# Patient Record
(demographics unavailable — no encounter records)

---

## 2024-10-29 NOTE — HISTORY OF PRESENT ILLNESS
[FreeTextEntry1] :  37 year old female presents for consultation. Sees Dr. Rowell. Notes double mastectomy due to breast cancer. States cannot be on BC anymore. Reports period comes every 2 weeks and are very heavy. Was advised from medical oncologist to be on Lupron injection for a few months and have bilateral oophorectomy. Dr. Rowell doesn't advise bilateral oophorectomy. Concerned about bone loss and other sxs from Lupron. Also concerned due to father dying at age 44 from MI. Had EKG done this year.   Sono reviewed EMB normal

## 2024-10-29 NOTE — PLAN
[FreeTextEntry1] :  37 year old female presents for consultation.  -Discussed Lupron injection -Addressed concerns about Lupron side effects -Continue seeing medical oncologist -pt had many med onc questions, encouraged to call office and speak with them about tamoxifen/Lupron, reviewed gyn affects of both medications  F/u after speaking with medical oncologist. Safia Casey MD

## 2024-10-29 NOTE — END OF VISIT
[FreeTextEntry3] : I, Suha Anton, acted as a scribe on behalf of Dr. Safia Casey M.D. on 10/29/2024.   All medical entries made by the scribe were at my, Dr. Safia Casey M.D., direction and personally dictated by me on 10/29/2024. I have reviewed the chart and agree that the record accurately reflects my personal performance of the history, physical exam, assessment and plan. I have also personally directed, reviewed, and agreed with the chart.

## 2024-11-20 NOTE — PHYSICAL EXAM
[Fully active, able to carry on all pre-disease performance without restriction] : Status 0 - Fully active, able to carry on all pre-disease performance without restriction [Normal] : affect appropriate [de-identified] : s/p B/L MRM with LARISA reconstruction - well healed scars, no palp masses. B/L ax neg [de-identified] : Multiple subcentimeter cysts on anterior chest and arms

## 2024-11-20 NOTE — ASSESSMENT
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  BRCA panel test negative.  Oncotype DX 18.  She met with Dr. Morales 9/2023 and tamoxifen was prescribed. She is transferring care from Dr. Morales, first visit with me today.  Chart reviewed.  Patient reports that she never started tamoxifen.  She is extremely concerned about endometrial toxicity and some of the other side effects related to tamoxifen.  She is under the impression that her cancer was low-grade and endocrine therapy is not necessary.  We reviewed pathology report in great detail.  We reviewed Oncotype results.  I explained the patient that chemotherapy  would have a marginal 1.6% benefit is not strongly recommended.  Endocrine therapy is strongly recommended due to strong ER/VA positivity and a relatively large tumor size.  Tamoxifen side effects were reviewed in detail.  She is very anxious about tamoxifen toxicity.  She reports very heavy menstrual periods.  We discussed treatment with ovarian suppression and anastrozole.  Patient is interested in ovarian suppression.  Side effects of Lupron and exemestane were reviewed.  She will start Lupron next week.  I will see her after 3-4 doses of Lupron to start AI.  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  We will monitor closely if ovarian suppression helps with inflammation and cysts.  Periods are regular, LMP 2/2024,  using condoms, stopped OCP after dx. Has 2 kids - 15 yrs and 5 yrs. Using condoms. Doesnt want more kids

## 2024-11-20 NOTE — HISTORY OF PRESENT ILLNESS
[de-identified] : The patient's history of present illness began when she first palpated a right upper breast lump sometime in February 2023.  She reports having a primary care routine visit scheduled for March 2023 and at that time she was referred for a diagnostic mammogram and sonogram.  These were performed on 04/20/2023 with the mammogram finding bilateral heterogeneously dense breasts; no suspicious findings were noted in the left breast; there was global asymmetry involving the right breast; in the area of the palpable concern, there was an isodense mass that was microlobulated and containing calcifications; additionally in close proximity, there was another area of architectural distortion with calcifications; there were microcalcifications at the anterior right retroareolar region that appeared to be a group of amorphous microcalcifications of note.  A bilateral breast ultrasound performed on that same date noted in the right breast in the area of palpable concern at the 1 o'clock axis, 3 centimeters from nipple, there was an irregular hypoechoic mass with angular margins measuring 2 x 1 x 2.1 centimeters, indeterminate; additionally at the 1 o'clock axis 2 centimeters from nipple, there was an additional hypoechoic irregular mass that measured 1.7 x 0.9 x 1.6 centimeters; there were multiple other round hypoechoic avascular masses measuring less than 1 centimeter throughout the right breast; there was a right axillary lymph node with cortical thickness of 0.4 centimeters.  An ultrasound sound guided core biopsy of the breast masses and axillary lymph node was recommended.  The patient went on to have an ultrasound-guided core biopsy of the right breast 1 o'clock axis lesion 3 centimeters from nipple with a finding of invasive moderately differentiated ductal carcinoma measuring 13 millimeters, with evidence of DCIS, estrogen receptor positive (greater than 95 percent), progesterone receptor positive (greater than 95 percent), and HER-2/prieto equivocal (2+) with confirmatory FISH returning non amplified/negative. The right axillary lymph node core biopsy returned with lymph node negative for carcinoma.     The patient went on to have a bilateral breast MRI on 05/19/2023 with a finding of marked background enhancement limiting sensitivity of bilateral breasts; in the right breast, contiguous multiple suspicious masses and regional area of non mass enhancement predominantly involving the upper outer and upper central breast with extension to the lower central and lower breast measuring at least 5.8 x 5.2 x 8.2 centimeters with a biopsy marker in the cranial aspect of the 1 o'clock lesions corresponding to biopsy-proven cancer; additionally there was asymmetric enhancement of the right nipple concerning for disease extension into the nipple-areolar complex; there was non mass enhancement abutting the pectoralis muscle without enhancement of the muscle itself or chest wall to suggest involvement; there are other scattered enhancing nonspecific foci; in the left breast, there were scattered enhancing nonspecific foci but no suspicious enhancement of the left breast; there were abnormal right axillary level 1 and level 2 lymph nodes with round morphology and effaced fatty hilum with the largest level 1 lymph node containing a biopsy marker measuring 1.4 centimeters with additional enlarged left axillary 1 lymph nodes ranging in size from 1.4-1.6 centimeters; there was an enlarged 8 millimeter right internal mammary lymph node; there was also a fat containing left internal mammary lymph node.  The patient was seen in consultation on 6/8/23 regarding further treatment recommendations.    The patient has had an [facility] Diagnostic Cancer Genetic Predisposition Panel Test done with a finding of no pathogenic mutations, but multiple variants of unknown significance.  I had an extensive discussion with Ms. Ambriz regarding her newly diagnosed breast cancer, clinical T3 N0 Mx, clinical stage 2A breast cancer, noting the implications of a clinical and radiologic presentation on subsequent risk of developing local and metastatic recurrence.   In light of the above, I recommended proceeding with primary breast surgery at this time, and the patient noted she has already decided to have bilateral mastectomies.  I have noted that if she is surgically operable, which I believe she is at this time, that doing so may provide us more information regarding definitive pathologic prognostic factors which may then drive decision-making as to her potential risk of metastatic recurrence and recommendations for potential adjuvant therapies.  I have noted that should she have a tumor less than 5 centimeters, and lymph node negative, I would consider having her tumor sent off for Oncotype DX testing, having discussed this molecular profiling tool and its role in decision-making as to whether to add adjuvant chemotherapy to adjuvant antiestrogen therapy.  Should she have any lymph node involvement, I have noted that per the RxPonder study, all patients would be recommended to undergo adjuvant chemotherapy and consequently would not send that study off.  I have noted that should she have any of the above that would be an indication for chemotherapy, that I would deliver adjuvant chemotherapy to be determined based on her pathologic findings to potentially decrease the risk of metastatic recurrence.  This would then be followed by adjuvant antiestrogen therapy with Lupron to render her chemically menopausal followed by an aromatase inhibitor such as anastrozole.  The potential risks, benefits, anticipated side effects were preliminarily discussed.  I have noted that should she have node-negative cancer, and have a low risk score on Oncotype DX testing, primary adjuvant antiestrogen therapy alone would be considered.   I asked the patient in advance if percarmandoce she requires adjuvant chemotherapy, whether she wishes any further childbearing and she has replied no and consequently will not refer her for fertility preservation consultation at this time.   I also noted that should her PET-CT scan return with evidence of metastatic disease, above recommendations would defer greatly.  I also discussed the potential role of adjuvant radiation therapy, even in the postmastectomy setting in some patients, having outlined preliminarily the criteria for doing so.  I have deferred definitive adjuvant radiation therapy recommendations to a radiation oncologist in the future.  She went on to have a R skin sparing MRM an dL breast nipple sparing MRM with LARISA reconstruction. Path: 1- Left breast 2- Right breast 3- Right axillary sentinel lymph node 1 4- Right axillary sentinel lymph node 2 5- Right axillary sentinel lymph node 3 6- Right internal mammary lymph node 7- Left internal mammary lymph node  Final Diagnosis  1. Breast, left, mastectomy - Indtraductal papilloma - Proliferative fibrocystic change  2. Breast, right, mastectomy - Invasive moderate to poorly differentiated ductal carcinoma with focal microcalcifications (size: 35 mm), see synoptic summary - Ductal carcinoma in situ (DCIS), solid, cribriform, papillary and micropapillary types with intermediate to high nuclear grade, central necrosis and microcalcifications - Two lymph nodes, negative for carcinoma - Biopsy site change  Note: DCIS extends over a 80  mm area. Invasive carcinoma involves the inked cauterized posterior margin in 2 blocks (largest contiguous focus of involvement measures 2 mm) and inked cauterized anterior margin (focus measures < 1 mm ).  3. Lymph node, right axillary sentinel lymph node 1, excision - Four lymph nodes, negative for carcinoma  4. Lymph node, right axillary sentinel lymph node 2, excision - Three lymph nodes negative for carcinoma  5. Lymph node, right axillary sentinel lymph node 3, excision - Seven lymph nodes negative for carcinoma  6. Lymph node, right internal mammary, excision - One lymph node negative for carcinoma   7. Lymph node, left internal mammary, excision - Fibroadipose tissue with no significant histopathological change - No lymphoid tissue identified  [de-identified] : This is a very pleasant 37-year-old premenopausal lady diagnosed with stage IA T2 N0 MX ER/ND strongly positive HER2/prieto negative moderate to poorly differentiated invasive ductal carcinoma, status post bilateral mastectomies with reconstruction 2023, completed radiation 2023 (margin issue).  BRCA panel test negative.  Oncotype DX 18.  She met with Dr. Morales 2023 and tamoxifen was prescribed. She is transferring care from Dr. Morales, first visit with me today.  Chart reviewed.  2024 Patient reports that she never started tamoxifen.  She is extremely concerned about endometrial toxicity and some of the other side effects related to tamoxifen.  She is under the impression that her cancer was low-grade and endocrine therapy is not necessary.  We reviewed pathology report in great detail.  We reviewed Oncotype results.  I explained the patient that chemotherapy  would have a marginal 1.6% benefit is not strongly recommended.  Endocrine therapy is strongly recommended due to strong ER/ND positivity and a relatively large tumor size.  Tamoxifen side effects were reviewed in detail.  She is very anxious about tamoxifen toxicity.  She reports very heavy menstrual periods.  We discussed treatment with ovarian suppression and anastrozole.  Patient is interested in ovarian suppression.  Side effects of Lupron and exemestane were reviewed.  She will start Lupron next week.  I will see her after 3-4 doses of Lupron to start AI.  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  We will monitor closely if ovarian suppression helps with inflammation and cysts.  Periods are regular, LMP 2024,  using condoms, stopped OCP after dx. Has 2 kids - 15 yrs and 5 yrs. Using condoms. Doesnt want more kids  2024 Seen [facility]  spoke to people, read reviewe insight from others didnt start  periods are heavy, every 2-3 weeks  GYN ???  not on OCP  ? BSO   and 2 kids  doesnt want to call - whatever i fell not   rec second opinion   odx path d/w her still concerned about  healthy  dont want to take and stuff starts happendng to me  had stage   PET  second opinion  ftaher  of heart attack  [place] or OS ai  [place]

## 2024-11-20 NOTE — HISTORY OF PRESENT ILLNESS
[de-identified] : The patient's history of present illness began when she first palpated a right upper breast lump sometime in February 2023.  She reports having a primary care routine visit scheduled for March 2023 and at that time she was referred for a diagnostic mammogram and sonogram.  These were performed on 04/20/2023 with the mammogram finding bilateral heterogeneously dense breasts; no suspicious findings were noted in the left breast; there was global asymmetry involving the right breast; in the area of the palpable concern, there was an isodense mass that was microlobulated and containing calcifications; additionally in close proximity, there was another area of architectural distortion with calcifications; there were microcalcifications at the anterior right retroareolar region that appeared to be a group of amorphous microcalcifications of note.  A bilateral breast ultrasound performed on that same date noted in the right breast in the area of palpable concern at the 1 o'clock axis, 3 centimeters from nipple, there was an irregular hypoechoic mass with angular margins measuring 2 x 1 x 2.1 centimeters, indeterminate; additionally at the 1 o'clock axis 2 centimeters from nipple, there was an additional hypoechoic irregular mass that measured 1.7 x 0.9 x 1.6 centimeters; there were multiple other round hypoechoic avascular masses measuring less than 1 centimeter throughout the right breast; there was a right axillary lymph node with cortical thickness of 0.4 centimeters.  An ultrasound sound guided core biopsy of the breast masses and axillary lymph node was recommended.  The patient went on to have an ultrasound-guided core biopsy of the right breast 1 o'clock axis lesion 3 centimeters from nipple with a finding of invasive moderately differentiated ductal carcinoma measuring 13 millimeters, with evidence of DCIS, estrogen receptor positive (greater than 95 percent), progesterone receptor positive (greater than 95 percent), and HER-2/prieto equivocal (2+) with confirmatory FISH returning non amplified/negative. The right axillary lymph node core biopsy returned with lymph node negative for carcinoma.     The patient went on to have a bilateral breast MRI on 05/19/2023 with a finding of marked background enhancement limiting sensitivity of bilateral breasts; in the right breast, contiguous multiple suspicious masses and regional area of non mass enhancement predominantly involving the upper outer and upper central breast with extension to the lower central and lower breast measuring at least 5.8 x 5.2 x 8.2 centimeters with a biopsy marker in the cranial aspect of the 1 o'clock lesions corresponding to biopsy-proven cancer; additionally there was asymmetric enhancement of the right nipple concerning for disease extension into the nipple-areolar complex; there was non mass enhancement abutting the pectoralis muscle without enhancement of the muscle itself or chest wall to suggest involvement; there are other scattered enhancing nonspecific foci; in the left breast, there were scattered enhancing nonspecific foci but no suspicious enhancement of the left breast; there were abnormal right axillary level 1 and level 2 lymph nodes with round morphology and effaced fatty hilum with the largest level 1 lymph node containing a biopsy marker measuring 1.4 centimeters with additional enlarged left axillary 1 lymph nodes ranging in size from 1.4-1.6 centimeters; there was an enlarged 8 millimeter right internal mammary lymph node; there was also a fat containing left internal mammary lymph node.  The patient was seen in consultation on 6/8/23 regarding further treatment recommendations.    The patient has had an [employer] Diagnostic Cancer Genetic Predisposition Panel Test done with a finding of no pathogenic mutations, but multiple variants of unknown significance.  I had an extensive discussion with Ms. Ambriz regarding her newly diagnosed breast cancer, clinical T3 N0 Mx, clinical stage 2A breast cancer, noting the implications of a clinical and radiologic presentation on subsequent risk of developing local and metastatic recurrence.   In light of the above, I recommended proceeding with primary breast surgery at this time, and the patient noted she has already decided to have bilateral mastectomies.  I have noted that if she is surgically operable, which I believe she is at this time, that doing so may provide us more information regarding definitive pathologic prognostic factors which may then drive decision-making as to her potential risk of metastatic recurrence and recommendations for potential adjuvant therapies.  I have noted that should she have a tumor less than 5 centimeters, and lymph node negative, I would consider having her tumor sent off for Oncotype DX testing, having discussed this molecular profiling tool and its role in decision-making as to whether to add adjuvant chemotherapy to adjuvant antiestrogen therapy.  Should she have any lymph node involvement, I have noted that per the RxPonder study, all patients would be recommended to undergo adjuvant chemotherapy and consequently would not send that study off.  I have noted that should she have any of the above that would be an indication for chemotherapy, that I would deliver adjuvant chemotherapy to be determined based on her pathologic findings to potentially decrease the risk of metastatic recurrence.  This would then be followed by adjuvant antiestrogen therapy with Lupron to render her chemically menopausal followed by an aromatase inhibitor such as anastrozole.  The potential risks, benefits, anticipated side effects were preliminarily discussed.  I have noted that should she have node-negative cancer, and have a low risk score on Oncotype DX testing, primary adjuvant antiestrogen therapy alone would be considered.   I asked the patient in advance if percarmandoce she requires adjuvant chemotherapy, whether she wishes any further childbearing and she has replied no and consequently will not refer her for fertility preservation consultation at this time.   I also noted that should her PET-CT scan return with evidence of metastatic disease, above recommendations would defer greatly.  I also discussed the potential role of adjuvant radiation therapy, even in the postmastectomy setting in some patients, having outlined preliminarily the criteria for doing so.  I have deferred definitive adjuvant radiation therapy recommendations to a radiation oncologist in the future.  She went on to have a R skin sparing MRM an dL breast nipple sparing MRM with LARISA reconstruction. Path: 1- Left breast 2- Right breast 3- Right axillary sentinel lymph node 1 4- Right axillary sentinel lymph node 2 5- Right axillary sentinel lymph node 3 6- Right internal mammary lymph node 7- Left internal mammary lymph node  Final Diagnosis  1. Breast, left, mastectomy - Indtraductal papilloma - Proliferative fibrocystic change  2. Breast, right, mastectomy - Invasive moderate to poorly differentiated ductal carcinoma with focal microcalcifications (size: 35 mm), see synoptic summary - Ductal carcinoma in situ (DCIS), solid, cribriform, papillary and micropapillary types with intermediate to high nuclear grade, central necrosis and microcalcifications - Two lymph nodes, negative for carcinoma - Biopsy site change  Note: DCIS extends over a 80  mm area. Invasive carcinoma involves the inked cauterized posterior margin in 2 blocks (largest contiguous focus of involvement measures 2 mm) and inked cauterized anterior margin (focus measures < 1 mm ).  3. Lymph node, right axillary sentinel lymph node 1, excision - Four lymph nodes, negative for carcinoma  4. Lymph node, right axillary sentinel lymph node 2, excision - Three lymph nodes negative for carcinoma  5. Lymph node, right axillary sentinel lymph node 3, excision - Seven lymph nodes negative for carcinoma  6. Lymph node, right internal mammary, excision - One lymph node negative for carcinoma   7. Lymph node, left internal mammary, excision - Fibroadipose tissue with no significant histopathological change - No lymphoid tissue identified  [de-identified] : This is a very pleasant 37-year-old premenopausal lady diagnosed with stage IA T2 N0 MX ER/MA strongly positive HER2/prieto negative moderate to poorly differentiated invasive ductal carcinoma, status post bilateral mastectomies with reconstruction 2023, completed radiation 2023 (margin issue).  BRCA panel test negative.  Oncotype DX 18.  She met with Dr. Morales 2023 and tamoxifen was prescribed. She is transferring care from Dr. Morales, first visit with me today.  Chart reviewed.  2024 Patient reports that she never started tamoxifen.  She is extremely concerned about endometrial toxicity and some of the other side effects related to tamoxifen.  She is under the impression that her cancer was low-grade and endocrine therapy is not necessary.  We reviewed pathology report in great detail.  We reviewed Oncotype results.  I explained the patient that chemotherapy  would have a marginal 1.6% benefit is not strongly recommended.  Endocrine therapy is strongly recommended due to strong ER/MA positivity and a relatively large tumor size.  Tamoxifen side effects were reviewed in detail.  She is very anxious about tamoxifen toxicity.  She reports very heavy menstrual periods.  We discussed treatment with ovarian suppression and anastrozole.  Patient is interested in ovarian suppression.  Side effects of Lupron and exemestane were reviewed.  She will start Lupron next week.  I will see her after 3-4 doses of Lupron to start AI.  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  We will monitor closely if ovarian suppression helps with inflammation and cysts.  Periods are regular, LMP 2024,  using condoms, stopped OCP after dx. Has 2 kids - 15 yrs and 5 yrs. Using condoms. Doesnt want more kids  2024 Seen [facility]  spoke to people, read reviewe insight from others didnt start  periods are heavy, every 2-3 weeks  GYN ???  not on OCP  ? BSO   and 2 kids  doesnt want to call - whatever i fell not   rec second opinion   odx path d/w her still concerned about  healthy  dont want to take and stuff starts happendng to me  had stage   PET  second opinion  ftaher  of heart attack  cortez or OS ai  ribo

## 2024-11-20 NOTE — ASSESSMENT
[FreeTextEntry1] : This is a very pleasant 37-year-old premenopausal lady diagnosed with stage IA T2 N0 MX ER/WV strongly positive HER2/prieto negative moderate to poorly differentiated invasive ductal carcinoma, status post bilateral mastectomies with reconstruction 7/2023, completed radiation 11/2023 (margin issue).  BRCA panel test negative.  Oncotype DX 18.  She met with Dr. Morales 9/2023 and tamoxifen was prescribed. She is transferring care from Dr. Morales, first visit with me today.  Chart reviewed.  Patient reports that she never started tamoxifen.  She is extremely concerned about endometrial toxicity and some of the other side effects related to tamoxifen.  She is under the impression that her cancer was low-grade and endocrine therapy is not necessary.  We reviewed pathology report in great detail.  We reviewed Oncotype results.  I explained the patient that chemotherapy  would have a marginal 1.6% benefit is not strongly recommended.  Endocrine therapy is strongly recommended due to strong ER/WV positivity and a relatively large tumor size.  Tamoxifen side effects were reviewed in detail.  She is very anxious about tamoxifen toxicity.  She reports very heavy menstrual periods.  We discussed treatment with ovarian suppression and anastrozole.  Patient is interested in ovarian suppression.  Side effects of Lupron and exemestane were reviewed.  She will start Lupron next week.  I will see her after 3-4 doses of Lupron to start AI.  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  We will monitor closely if ovarian suppression helps with inflammation and cysts.  Periods are regular, LMP 2/2024,  using condoms, stopped OCP after dx. Has 2 kids - 15 yrs and 5 yrs. Using condoms. Doesnt want more kids

## 2024-11-20 NOTE — PHYSICAL EXAM
[Fully active, able to carry on all pre-disease performance without restriction] : Status 0 - Fully active, able to carry on all pre-disease performance without restriction [Normal] : affect appropriate [de-identified] : s/p B/L MRM with LARISA reconstruction - well healed scars, no palp masses. B/L ax neg [de-identified] : Multiple subcentimeter cysts on anterior chest and arms

## 2024-12-18 NOTE — HISTORY OF PRESENT ILLNESS
[FreeTextEntry1] : Pt s/p right breast nipple reconstruction and left breast fat grafting.  Pt doing well no complaints.

## 2024-12-18 NOTE — REVIEW OF SYSTEMS
[Fever] : no fever [Chills] : no chills [Negative] : Respiratory [de-identified] : Bra and dressings in place

## 2024-12-18 NOTE — REVIEW OF SYSTEMS
[Fever] : no fever [Chills] : no chills [Negative] : Respiratory [de-identified] : Bra and dressings in place

## 2024-12-18 NOTE — PHYSICAL EXAM
[NI] : Normal [de-identified] : Right breast nipple healing well no sign of infection no erythema.  Left breast healing well fat grafting taken no sign of infection  [de-identified] : Abdomen puncture sites healing well no sign of infection

## 2024-12-18 NOTE — PHYSICAL EXAM
[NI] : Normal [de-identified] : Right breast nipple healing well no sign of infection no erythema.  Left breast healing well fat grafting taken no sign of infection  [de-identified] : Abdomen puncture sites healing well no sign of infection

## 2024-12-18 NOTE — REASON FOR VISIT
[Post Op: _________] : a [unfilled] post op visit [FreeTextEntry1] : Patient is a 37-year-old female s/p: -revision bilateral breasts-Rt nipple recon-FG Lt breast, dos:12.6.24. Patient reports she is doing well, she denies having bleeding, drainage, fever or chills.

## 2025-01-02 NOTE — PHYSICAL EXAM
[NI] : Normal [de-identified] : Right breast nipple healing well no sign of infection no erythema.  Left breast healing well fat grafting taken no sign of infection  [de-identified] : Abdomen puncture sites healing well no sign of infection

## 2025-01-02 NOTE — REVIEW OF SYSTEMS
[Fever] : no fever [Chills] : no chills [Negative] : Respiratory [de-identified] : Bra and dressings in place

## 2025-01-02 NOTE — PHYSICAL EXAM
[NI] : Normal [de-identified] : Right breast nipple healing well no sign of infection no erythema.  Left breast healing well fat grafting taken no sign of infection  [de-identified] : Abdomen puncture sites healing well no sign of infection

## 2025-01-02 NOTE — REVIEW OF SYSTEMS
[Fever] : no fever [Chills] : no chills [Negative] : Respiratory [de-identified] : Bra and dressings in place

## 2025-01-15 NOTE — CONSULT LETTER
[Dear  ___] : Dear  [unfilled], [Courtesy Letter:] : I had the pleasure of seeing your patient, [unfilled], in my office today. [Please see my note below.] : Please see my note below. [Consult Closing:] : Thank you very much for allowing me to participate in the care of this patient.  If you have any questions, please do not hesitate to contact me. [Sincerely,] : Sincerely, [FreeTextEntry3] : Sita Sol MD Attending Physician, Division of Medical Oncology and Hematology Medical Director, [facility] Reproduction Medical Director, Breast Wellness and Cancer Prevention Program  [facility] [place] , [employer] [facility]

## 2025-01-15 NOTE — PHYSICAL EXAM
[Fully active, able to carry on all pre-disease performance without restriction] : Status 0 - Fully active, able to carry on all pre-disease performance without restriction [Normal] : affect appropriate [de-identified] : s/p B/L MRM with LARISA reconstruction - well healed scars, no palp masses. B/L ax neg [de-identified] : Multiple subcentimeter cysts on anterior chest and arms

## 2025-01-15 NOTE — ASSESSMENT
[FreeTextEntry1] : This is a very pleasant 37-year-old premenopausal lady diagnosed with stage IA T2 N0 MX ER/KS strongly positive HER2/prieto negative moderate to poorly differentiated invasive ductal carcinoma, status post bilateral mastectomies with reconstruction 7/2023, completed radiation 11/2023 (margin issue).  BRCA panel test negative.  Oncotype DX 18.  She met with Dr. Morales 9/2023 and tamoxifen was prescribed.   1/2025 CT CAP and bone scan 1/2025 JAMILA Here to start lupron today S/e reviewed AI to start 3/2025, ribo 4/2025 pt in agreement   We discussed cardio oncology and endocrinology management of side effects.   We also discussed recent approval of ribociclib for patients with high risk breast cancer.  Patient meets the criteria for adjuvant ribociclib (T2 N0 grade 3 tumor).  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  We will monitor closely if ovarian suppression helps with inflammation and cysts.  Periods are regular, LMP 1/2025  using condoms, stopped OCP after dx. Has 2 kids - 15 yrs and 5 yrs. Using condoms. Doesnt want more kids  rto 8 weeks

## 2025-01-15 NOTE — HISTORY OF PRESENT ILLNESS
[facility] - Inpatient Rehabilitation Department   Phone: [phone]    Physical Therapy    [x] Initial Evaluation            [] Daily Treatment Note         [] Discharge Summary      Patient: Beth Branch   : 1965   MRN: [id number]   Date of Service:  4/10/2024  Admitting Diagnosis: Seizure disorder (HCC)  Current Admission Summary: Beth Branch is a 58 y.o. female with history of DM 2, Afib on Xarelto, seizure disorder, HTN, h/o stroke, COPD, chronic pain syndrome, anxiety was brought to ER with LOC.  Patient apparently had a concussion a month ago.  She has not been feeling well since.  Went to work yesterday and felt poorly.  Patient was nauseated and felt like vomiting.  She has been following with Dr Holley (Cardio) at [facility] and has a loop recorder.  She has HA.  This morning,  found her difficult to arouse.  He called 911 and was instructed to start CPR.  She was never blue or stopped breathing from what he could tell.  Patient denies any urine or fecal incontinence.  No numbness, weakness or tingling.  Has CP now from CPR.  No fevers, chills or NS.  Denies taking any extra meds.  Otherwise complete ROS is negative unless listed above.   Past Medical History:  has a past medical history of ADHD (attention deficit hyperactivity disorder), Alcohol use disorder in remission, Atrial fibrillation (HCC), Breast cancer (HCC), C. difficile colitis, Chronic kidney disease, Cocaine use disorder in remission, Colon cancer (HCC), Colovesical fistula, Congestive heart failure (HCC), Controlled substance agreement broken, COPD (chronic obstructive pulmonary disease) (HCC), Diabetes mellitus (HCC), Diverticulitis, Diverticulosis, Dupuytren's contracture of hand, GERD (gastroesophageal reflux disease), Gout, Hyperlipidemia, Hypertension, Ischemic stroke (HCC), Kidney stones, Migraines, Mitral regurgitation, Mood disorder (HCC), Multiple sclerosis (HCC), Myocardial infarction, Narcolepsy,  [de-identified] : The patient's history of present illness began when she first palpated a right upper breast lump sometime in February 2023.  She reports having a primary care routine visit scheduled for March 2023 and at that time she was referred for a diagnostic mammogram and sonogram.  These were performed on 04/20/2023 with the mammogram finding bilateral heterogeneously dense breasts; no suspicious findings were noted in the left breast; there was global asymmetry involving the right breast; in the area of the palpable concern, there was an isodense mass that was microlobulated and containing calcifications; additionally in close proximity, there was another area of architectural distortion with calcifications; there were microcalcifications at the anterior right retroareolar region that appeared to be a group of amorphous microcalcifications of note.  A bilateral breast ultrasound performed on that same date noted in the right breast in the area of palpable concern at the 1 o'clock axis, 3 centimeters from nipple, there was an irregular hypoechoic mass with angular margins measuring 2 x 1 x 2.1 centimeters, indeterminate; additionally at the 1 o'clock axis 2 centimeters from nipple, there was an additional hypoechoic irregular mass that measured 1.7 x 0.9 x 1.6 centimeters; there were multiple other round hypoechoic avascular masses measuring less than 1 centimeter throughout the right breast; there was a right axillary lymph node with cortical thickness of 0.4 centimeters.  An ultrasound sound guided core biopsy of the breast masses and axillary lymph node was recommended.  The patient went on to have an ultrasound-guided core biopsy of the right breast 1 o'clock axis lesion 3 centimeters from nipple with a finding of invasive moderately differentiated ductal carcinoma measuring 13 millimeters, with evidence of DCIS, estrogen receptor positive (greater than 95 percent), progesterone receptor positive (greater than 95 percent), and HER-2/prieto equivocal (2+) with confirmatory FISH returning non amplified/negative. The right axillary lymph node core biopsy returned with lymph node negative for carcinoma.     The patient went on to have a bilateral breast MRI on 05/19/2023 with a finding of marked background enhancement limiting sensitivity of bilateral breasts; in the right breast, contiguous multiple suspicious masses and regional area of non mass enhancement predominantly involving the upper outer and upper central breast with extension to the lower central and lower breast measuring at least 5.8 x 5.2 x 8.2 centimeters with a biopsy marker in the cranial aspect of the 1 o'clock lesions corresponding to biopsy-proven cancer; additionally there was asymmetric enhancement of the right nipple concerning for disease extension into the nipple-areolar complex; there was non mass enhancement abutting the pectoralis muscle without enhancement of the muscle itself or chest wall to suggest involvement; there are other scattered enhancing nonspecific foci; in the left breast, there were scattered enhancing nonspecific foci but no suspicious enhancement of the left breast; there were abnormal right axillary level 1 and level 2 lymph nodes with round morphology and effaced fatty hilum with the largest level 1 lymph node containing a biopsy marker measuring 1.4 centimeters with additional enlarged left axillary 1 lymph nodes ranging in size from 1.4-1.6 centimeters; there was an enlarged 8 millimeter right internal mammary lymph node; there was also a fat containing left internal mammary lymph node.  The patient was seen in consultation on 6/8/23 regarding further treatment recommendations.    The patient has had an [employer] Diagnostic Cancer Genetic Predisposition Panel Test done with a finding of no pathogenic mutations, but multiple variants of unknown significance.  I had an extensive discussion with Ms. Ambriz regarding her newly diagnosed breast cancer, clinical T3 N0 Mx, clinical stage 2A breast cancer, noting the implications of a clinical and radiologic presentation on subsequent risk of developing local and metastatic recurrence.   In light of the above, I recommended proceeding with primary breast surgery at this time, and the patient noted she has already decided to have bilateral mastectomies.  I have noted that if she is surgically operable, which I believe she is at this time, that doing so may provide us more information regarding definitive pathologic prognostic factors which may then drive decision-making as to her potential risk of metastatic recurrence and recommendations for potential adjuvant therapies.  I have noted that should she have a tumor less than 5 centimeters, and lymph node negative, I would consider having her tumor sent off for Oncotype DX testing, having discussed this molecular profiling tool and its role in decision-making as to whether to add adjuvant chemotherapy to adjuvant antiestrogen therapy.  Should she have any lymph node involvement, I have noted that per the RxPonder study, all patients would be recommended to undergo adjuvant chemotherapy and consequently would not send that study off.  I have noted that should she have any of the above that would be an indication for chemotherapy, that I would deliver adjuvant chemotherapy to be determined based on her pathologic findings to potentially decrease the risk of metastatic recurrence.  This would then be followed by adjuvant antiestrogen therapy with Lupron to render her chemically menopausal followed by an aromatase inhibitor such as anastrozole.  The potential risks, benefits, anticipated side effects were preliminarily discussed.  I have noted that should she have node-negative cancer, and have a low risk score on Oncotype DX testing, primary adjuvant antiestrogen therapy alone would be considered.   I asked the patient in advance if percarmandoce she requires adjuvant chemotherapy, whether she wishes any further childbearing and she has replied no and consequently will not refer her for fertility preservation consultation at this time.   I also noted that should her PET-CT scan return with evidence of metastatic disease, above recommendations would defer greatly.  I also discussed the potential role of adjuvant radiation therapy, even in the postmastectomy setting in some patients, having outlined preliminarily the criteria for doing so.  I have deferred definitive adjuvant radiation therapy recommendations to a radiation oncologist in the future.  She went on to have a R skin sparing MRM an dL breast nipple sparing MRM with LARISA reconstruction. Path: 1- Left breast 2- Right breast 3- Right axillary sentinel lymph node 1 4- Right axillary sentinel lymph node 2 5- Right axillary sentinel lymph node 3 6- Right internal mammary lymph node 7- Left internal mammary lymph node  Final Diagnosis  1. Breast, left, mastectomy - Indtraductal papilloma - Proliferative fibrocystic change  2. Breast, right, mastectomy - Invasive moderate to poorly differentiated ductal carcinoma with focal microcalcifications (size: 35 mm), see synoptic summary - Ductal carcinoma in situ (DCIS), solid, cribriform, papillary and micropapillary types with intermediate to high nuclear grade, central necrosis and microcalcifications - Two lymph nodes, negative for carcinoma - Biopsy site change  Note: DCIS extends over a 80  mm area. Invasive carcinoma involves the inked cauterized posterior margin in 2 blocks (largest contiguous focus of involvement measures 2 mm) and inked cauterized anterior margin (focus measures < 1 mm ).  3. Lymph node, right axillary sentinel lymph node 1, excision - Four lymph nodes, negative for carcinoma  4. Lymph node, right axillary sentinel lymph node 2, excision - Three lymph nodes negative for carcinoma  5. Lymph node, right axillary sentinel lymph node 3, excision - Seven lymph nodes negative for carcinoma  6. Lymph node, right internal mammary, excision - One lymph node negative for carcinoma   7. Lymph node, left internal mammary, excision - Fibroadipose tissue with no significant histopathological change - No lymphoid tissue identified  2/2024 She is transferring care from Dr. Morales, first visit with me today.  Chart reviewed. Patient reports that she never started tamoxifen.  She is extremely concerned about endometrial toxicity and some of the other side effects related to tamoxifen.  She is under the impression that her cancer was low-grade and endocrine therapy is not necessary.  We reviewed pathology report in great detail.  We reviewed Oncotype results.  I explained the patient that chemotherapy  would have a marginal 1.6% benefit is not strongly recommended.  Endocrine therapy is strongly recommended due to strong ER/NC positivity and a relatively large tumor size.  Tamoxifen side effects were reviewed in detail.  She is very anxious about tamoxifen toxicity.  She reports very heavy menstrual periods.  We discussed treatment with ovarian suppression and anastrozole.  Patient is interested in ovarian suppression.  Side effects of Lupron and exemestane were reviewed.  She will start Lupron next week.  I will see her after 3-4 doses of Lupron to start AI.  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  We will monitor closely if ovarian suppression helps with inflammation and cysts.  Periods are regular, LMP 2/2024,  using condoms, stopped OCP after dx. Has 2 kids - 15 yrs and 5 yrs. Using condoms. Doesnt want more kids  11/20/2024 This is my second visit with her.  At first visit 2/2024 I strongly recommended to start endocrine therapy for curative intent.  At that time patient seemed to be in favor of Lupron plus AI but did not return to office for injections or appointments.  She was lost to follow-up.  Today she returned to office because she has breast pain.  She has been undergoing cosmetic procedures.  She is concerned about cancer recurrence.  She is requesting a PET scan.  She met with Dr. Casey (GYN) and wanted to get BSO.  Dr. Casey encouraged her to see me for a follow-up to discuss cancer directed therapy.  Patient reports that she has been speaking to people and has been reading reviews "to get insight about cancer treatment".  She did not start cancer treatment in February 2024 as she was concerned about side effects. Patient stated that she is "healthy" and she " had" cancer.  We reviewed pathology report in detail.  She had T2, grade 3 tumor with Oncotype DX of 18.  Chemotherapy had 1.6% benefit and was not offered.  Endocrine therapy was recommended by Dr. Morales (initial consult) and by me (second visit)  We discussed that there is a high likelihood of cancer recurrence and distant metastatic spread  leading to untimely death without adjuvant therapy.  Patient understood what was explained to her but was not quite convinced to start treatment.  I strongly encouraged her to consider second medical oncology opinion.  I also offered to call her spouse to explain that noncompliance with cancer treatment can lead to untimely death.  She did not want me to reach out to her spouse and stated  is on her side. She has very heavy menstrual bleeding.  We discussed side effects of tamoxifen as well as OS plus AI in detail.  I will favor OS plus AI given her young age of diagnosis and high risk disease.  She seems to be in favor of ovarian suppression but is also concerned about cardiac and bone toxicity.  We discussed cardio oncology and endocrinology management of side effects.   We also discussed recent approval of ribociclib for patients with high risk breast cancer.  Patient meets the criteria for adjuvant ribociclib (T2 N0 grade 3 tumor).   Despite a long discussion, patient seems to have conflicted opinion about adjuvant endocrine therapy We discussed to obtain a PET/CT and, follow-up with me in 1 to 2 weeks.   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  We will monitor closely if ovarian suppression helps with inflammation and cysts.  Periods are regular, LMP 11/2024,  using condoms, stopped OCP after dx. Has 2 kids - 15 yrs and 5 yrs. Using condoms. Doesnt want more kids  [de-identified] : This is a very pleasant 37-year-old premenopausal lady diagnosed with stage IA T2 N0 MX ER/NV strongly positive HER2/prieto negative poorly differentiated invasive ductal carcinoma, status post bilateral mastectomies with reconstruction 7/2023, completed radiation 11/2023 (margin issue).  BRCA panel test negative.  Oncotype DX 18.  She met with Dr. Morales 9/2023 and tamoxifen was prescribed. Patient never started tamoxifen and saw me for first visit 2/2024.  We reviewed pathology and strongly recommended endocrine therapy.  Options for tamoxifen versus OS plus AI reviewed.  1/2025 CT CAP and bone scan 1/2025 JAMILA Here to start lupron today S/e reviewed AI to start 3/2025, ribo 4/2025 pt in agreement   We discussed cardio oncology and endocrinology management of side effects.   We also discussed recent approval of ribociclib for patients with high risk breast cancer.  Patient meets the criteria for adjuvant ribociclib (T2 N0 grade 3 tumor).  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  We will monitor closely if ovarian suppression helps with inflammation and cysts.  Periods are regular, LMP 1/2025  using condoms, stopped OCP after dx. Has 2 kids - 15 yrs and 5 yrs. Using condoms. Doesnt want more kids

## 2025-02-25 NOTE — HISTORY OF PRESENT ILLNESS
[de-identified] : 38-y/0 F w/ R cT2 cN0 IDC/DCIS+/+/- dx 2023-> pT2 pN0 s/p R SSM, R SLNBx (0), L NSM with LARISA (Dr. Solomon) on 23.  [R 3.5 cm IDC, DCIS spanning over an 8.0 cm]. s/p R PMRT. Here for follow up.   Breast Course:  23 presented to office for Biopsy proven Right breast cancer of palpable mass- R 1N3 2.4 cm IDC/DCIS ER/SC Positive, HER2 Equiv (2+) FISH Negative. Patient endorses chronic sebaceous skin lesions along neck/chest/axilla.   23 INVITAE (BRCA 1/2 and multi-cancer panel) Negative, VUS x 5  23 Breast MRI: Right 1:00 bx proven malignancy w/ MR findings suggestive for large extent of disease w/ multiple suspicious continuous masses and NME spanning 8.2cm involving entire R upper breast to lower breast involving areolar complex. B/L abnormal appearing axillary LNs, Enlarged Right IM LN 23 s/p B/L axillary core bx for abnormal LNs with Benign findings  23 PET with no evidence of distant Mets, No Right IM LN seen on PET.   23 Met with Dr. Morales (Med onc)  23 Met with Dr. Pearson. Recommended PMRT to right chest wall, faye radiation is not indicated.  23 Met with Carmen Vazquez after surgery: Discussed the potential 1.6% absolute benefit from adjuvant chemo. She has declined chemo. Recommendations for adjuvant tamoxifen after PMRT.  23 PMRT discussion at tumor board   10/923-10/30/23 completed PMRT to Right chest wall.  23 Saw Dermatology, two areas flaring on right chest. These were starting to flare at the last visit and trial of ILK helped only minimally with one being very tender s/p I&D.  Culture negative. 24 Reports Right inframammary post-mastectomy pain.   States she had two blisters develop in that site after radiation that have since improved.   however, she has had boils with resolution and recurrence as well.  Received a cortisone injection from derm with no improvement.   No fevers or chills.  She was instructed to continue scar massage to areas of fat necrosis. will plan for right targeted ultrasound to sites of recurring pain.  24. s/p excisional biopsy of Left UOQ dermal cyst   24 Met w/ Dr. Sol, plan to start Lupron next week. Will meet after 3-4 doses of Lupron to start AI. 24 Since last visit. Left UOQ tissue bx demonstrated Fragments of inflamed, ruptured epidermal cyst and inflamed granulation tissue, recent R Dx US, rated BiR1 with no sonographic findings to explain patients breast pain.  24 s/p I&D of Left neck cyst.  24 s/p excisional biopsy of right breast steatocystoma's x3 on 24.   24 s/p revision bilateral breasts-Rt nipple recon-FG Lt breast - benign surgical pathology.  25 Presented for follow up. No new breast symptoms or systemic symptoms. States she had her 2nd injection of Lupron and will meet again with Dr. Sol on 3/14/25 to further discuss endocrine therapy. Requesting neck/chest steatocystoma's be removed.       Referred by: Dr. Ronda Osborne (GYN)  No PMHx or prior surgical hx  Meds: Stopped taking OCP (Junel FE, prev on Nortel) on 2023  NKDA Family Hx: Breast cancer ( Maternal Aunts x2 dx -  at 40, dx at age late 50s) Ovarian Teratoma ( Maternal aunt - at 24s).   GYN: , menarche age 11, LMP 2023 - Patient reports taking OCP every 3 weeks to avoid having a period . Age at first pregnancy 16.  Y, ( 2-3 weeks)  Bra size: 34 B Occupation: office  Social: Smoking: Denies        ETOH: Socially - 3-4 mixed drinks

## 2025-02-25 NOTE — HISTORY OF PRESENT ILLNESS
[de-identified] : 38-y/0 F w/ R cT2 cN0 IDC/DCIS+/+/- dx 2023-> pT2 pN0 s/p R SSM, R SLNBx (0), L NSM with LARISA (Dr. Solomon) on 23.  [R 3.5 cm IDC, DCIS spanning over an 8.0 cm]. s/p R PMRT. Here for follow up.   Breast Course:  23 presented to office for Biopsy proven Right breast cancer of palpable mass- R 1N3 2.4 cm IDC/DCIS ER/NJ Positive, HER2 Equiv (2+) FISH Negative. Patient endorses chronic sebaceous skin lesions along neck/chest/axilla.   23 INVITAE (BRCA 1/2 and multi-cancer panel) Negative, VUS x 5  23 Breast MRI: Right 1:00 bx proven malignancy w/ MR findings suggestive for large extent of disease w/ multiple suspicious continuous masses and NME spanning 8.2cm involving entire R upper breast to lower breast involving areolar complex. B/L abnormal appearing axillary LNs, Enlarged Right IM LN 23 s/p B/L axillary core bx for abnormal LNs with Benign findings  23 PET with no evidence of distant Mets, No Right IM LN seen on PET.   23 Met with Dr. Morales (Med onc)  23 Met with Dr. Pearson. Recommended PMRT to right chest wall, faye radiation is not indicated.  23 Met with Carmen Vazquez after surgery: Discussed the potential 1.6% absolute benefit from adjuvant chemo. She has declined chemo. Recommendations for adjuvant tamoxifen after PMRT.  23 PMRT discussion at tumor board   10/923-10/30/23 completed PMRT to Right chest wall.  23 Saw Dermatology, two areas flaring on right chest. These were starting to flare at the last visit and trial of ILK helped only minimally with one being very tender s/p I&D.  Culture negative. 24 Reports Right inframammary post-mastectomy pain.   States she had two blisters develop in that site after radiation that have since improved.   however, she has had boils with resolution and recurrence as well.  Received a cortisone injection from derm with no improvement.   No fevers or chills.  She was instructed to continue scar massage to areas of fat necrosis. will plan for right targeted ultrasound to sites of recurring pain.  24. s/p excisional biopsy of Left UOQ dermal cyst   24 Met w/ Dr. Sol, plan to start Lupron next week. Will meet after 3-4 doses of Lupron to start AI. 24 Since last visit. Left UOQ tissue bx demonstrated Fragments of inflamed, ruptured epidermal cyst and inflamed granulation tissue, recent R Dx US, rated BiR1 with no sonographic findings to explain patients breast pain.  24 s/p I&D of Left neck cyst.  24 s/p excisional biopsy of right breast steatocystoma's x3 on 24.   24 s/p revision bilateral breasts-Rt nipple recon-FG Lt breast - benign surgical pathology.  25 Presented for follow up. No new breast symptoms or systemic symptoms. States she had her 2nd injection of Lupron and will meet again with Dr. Sol on 3/14/25 to further discuss endocrine therapy. Requesting neck/chest steatocystoma's be removed.       Referred by: Dr. Ronda Osborne (GYN)  No PMHx or prior surgical hx  Meds: Stopped taking OCP (Junel FE, prev on Nortel) on 2023  NKDA Family Hx: Breast cancer ( Maternal Aunts x2 dx -  at 40, dx at age late 50s) Ovarian Teratoma ( Maternal aunt - at 24s).   GYN: , menarche age 11, LMP 2023 - Patient reports taking OCP every 3 weeks to avoid having a period . Age at first pregnancy 16.  Y, ( 2-3 weeks)  Bra size: 34 B Occupation: office  Social: Smoking: Denies        ETOH: Socially - 3-4 mixed drinks

## 2025-02-25 NOTE — RESULTS/DATA
[FreeTextEntry1] : BREAST PATH/RAD REVIEW [facility]:  4/20/2023 BL Dx MG/US: BIRADs 4, Heterogeneously dense -  Global asymmetry involving R breast  - Indeterminant R 1N3 (palp) 2.1 cm irregular hypoechoic mass w/ angular margins- Rec USGBx, R 1N2 1.7 cm additional hypoechoic irregular mass - R 0.4 cm axillary LN w/ cortical thickening - Rec USGBx  - Indeterminate R anterior RA grouped amorphous microcalcs and R upper posterior architectural distortion - Rec tissue sampling x2  based on R Ax LN and R 1:00 mass pathology results  - R 12N2 1.2 cm hypoechoic oval circumscribed mass w/ suggestion of posterior acoustic enhancement - may reflect a focally dilated duct with intraluminal content  - R 2N3 0.7 cm round hypoechoic mass, R hypoechoic round masses ( R 2N1 0.8 cm, R 8N4 0.7 cm, 8N2 0.4 cm) - Rec 6 month f/u pending pathology results   [place]:  4/28/2023 R USGBx 2: ~ R 1N3 2.4 cm core bx: Moderately differentiated IDC, DCIS intermediate grade, associated w/ necrosis. ER >95%, TN >95% HER-2 Equiv (2+)- FISH Negative. Buckle shaped clip. Concordant and malignant  ~ R Axilla 0.5 cm core bx: Lymph node,negative for carcinoma. [place] shaped clip. Concordant and benign.  AE 1/3 and CAM5 immunostains Negative   Surgical Pathology Addendum [facility] :  - R RA Calcs-  Rec Stereo Bx  - R upper posterior questioned area of distortion corresp to area of known malignancy  - Multiple Right additional hypoechoic masses - Rec USGBx of R 8N4 as this is the most suspicious finding distant from index lesion, Correlation w/ breast MRI also rec and additional bx can be preformed base on MRI findings   5/19/23 Breast MRI: BIRADs 6.  - 1.  Marked background enhancement limiting sensitivity  - 2. R bx- proven malignancy at 1:00 - R UIQ w/ MR findings highly suggestive for large extent of disease with multiple suspicious continuous masses and NME measuring 8.2 cm involving predominately the entire right upper breast extending to the lower breast and nipple areolar complex - suspicious for involvement.  - 3. Multiple additional suspicious hypoechoic masses noted on prior R Dx US on 4/20/23, R RA calcs on outside report - (Rec USGBx of R 8N4 mass - most distant to index lesion and Stereo bx of R RA calcs if it would change remedios)  - 4. Morphologically abnormal appearing R level 1 and 2  axillary LN and few enlarged L level 1 axillary LNs ( Rec Dx US, possible USGBx) - 5.  Enlarged R IM LN ( Rec PET)   5/31/23 B/L Dx US:  - R prev bx'ed 1.1 cm LN w/ clip, Additional R 1.7 cm axillary LN w/ mildly thickened cortex measuring 4-5 mm (decision made to bx) - L axillary 2.2 cm LN w/ mildly thickened cortex, additional : axillary 1.8 cm axillary LN w/ eccentrically thickened cortex (decision made to bx), L superficial hypoechoic mass and L superficial echogenic mass w/ dermal tracts most c/w sebaceous or epidermal inclusion cysts   5/31/23 B/L USGBx 1. Left axillary core bx: Small fragment of benign lymphoid tissue c/w LN. Benign and concordant. Twirl Clip 2. Right axillary core bx: Fragment of benign lymphoid tissue c/w LN.  Benign and concordant Twirl clip   6/8/23 PET:  1. FGD avid R breast mass corresp to known malignancy  2. Minimally FDG- avid prominent B/L axillary LN are compatible w/ recent benign biopsies  3. FDG-avid uterine fibroid and small focus of FDG activity in Left ovary may be physiologic. (Rec Pelvis US for further eval 4. L lower thoracic paraspinal muscle hypermetabolism may reflect inflammation or muscle spasm ( Correlate clinically)  5. Remainder of study demonstrated no evidence of Mets   7/5/2023 s/p R SSM, R SLNBx (0/17), L NSM with LARISA reconstruction (Dr. Solomon) at [facility] ~ ypT2 pN0 1. L mastectomy: IDP, FCC.  2. R mastectomy: IDC (G3) w/ focal microcalcs to be 3.5 cm involving the posterior (largest focus is 2mm) and anterior margin focally (focus <1 mm). DCIS (G2-3) w/ microcalcs extends over an 8.0 cm area. (0/2 LN negative for carcinoma).   2/6/24 Left UOQ tissue bx: Fragments of inflamed, ruptured epidermal cyst and inflamed granulation tissue.  2/12/24 R Dx US: BIRADs 1. No sonographic evidence of malignancy or findings to explain patients right breast pain. (Rec clinical f/u)

## 2025-02-25 NOTE — ASSESSMENT
[FreeTextEntry1] : 38-y/o F w/ R cT2 cN0 IDC/DCIS+/+/- dx 4/2023-> pT2 pN0 s/p R SSM, R SLNBx (0/14), L NSM with LARISA (Dr. Solomon) on 7/5/23.  [R 3.5 cm IDC, DCIS spanning over an 8.0 cm]. s/p R PMRT   5/11/23 INVITAE (panel) Negative, VUS x 5 Oncotype RS: 18.   Patient c/o multiple steatocystoma's that are bothering her. Palpable right neck steatocystoma coming to a head was manually expressed with local wound care given to area. She requested abdominal cyst be excised. Area was numbed with 1% lidocaine plain; small 0.5 cm incision was made with cyst contents removed and sent to pathology.  No signs of infection noted.    - f/u tissue pathology.  - Medical Oncology: Following with Dr. Sol. Next appointment scheduled for 3/12/25. On Lupron, and states she is open to further discussion regarding endocrine therapy.  - Radiation Oncology: Following with Dr. Pearson completed PMRT to right chest wall on 10/30/23.  -  Plastic Surgery: Following with Dr. Solomon.  - RTO in 6 months

## 2025-02-25 NOTE — RESULTS/DATA
[FreeTextEntry1] : BREAST PATH/RAD REVIEW [facility]:  4/20/2023 BL Dx MG/US: BIRADs 4, Heterogeneously dense -  Global asymmetry involving R breast  - Indeterminant R 1N3 (palp) 2.1 cm irregular hypoechoic mass w/ angular margins- Rec USGBx, R 1N2 1.7 cm additional hypoechoic irregular mass - R 0.4 cm axillary LN w/ cortical thickening - Rec USGBx  - Indeterminate R anterior RA grouped amorphous microcalcs and R upper posterior architectural distortion - Rec tissue sampling x2  based on R Ax LN and R 1:00 mass pathology results  - R 12N2 1.2 cm hypoechoic oval circumscribed mass w/ suggestion of posterior acoustic enhancement - may reflect a focally dilated duct with intraluminal content  - R 2N3 0.7 cm round hypoechoic mass, R hypoechoic round masses ( R 2N1 0.8 cm, R 8N4 0.7 cm, 8N2 0.4 cm) - Rec 6 month f/u pending pathology results   [place]:  4/28/2023 R USGBx 2: ~ R 1N3 2.4 cm core bx: Moderately differentiated IDC, DCIS intermediate grade, associated w/ necrosis. ER >95%, NC >95% HER-2 Equiv (2+)- FISH Negative. Buckle shaped clip. Concordant and malignant  ~ R Axilla 0.5 cm core bx: Lymph node,negative for carcinoma. [place] shaped clip. Concordant and benign.  AE 1/3 and CAM5 immunostains Negative   Surgical Pathology Addendum [facility] :  - R RA Calcs-  Rec Stereo Bx  - R upper posterior questioned area of distortion corresp to area of known malignancy  - Multiple Right additional hypoechoic masses - Rec USGBx of R 8N4 as this is the most suspicious finding distant from index lesion, Correlation w/ breast MRI also rec and additional bx can be preformed base on MRI findings   5/19/23 Breast MRI: BIRADs 6.  - 1.  Marked background enhancement limiting sensitivity  - 2. R bx- proven malignancy at 1:00 - R UIQ w/ MR findings highly suggestive for large extent of disease with multiple suspicious continuous masses and NME measuring 8.2 cm involving predominately the entire right upper breast extending to the lower breast and nipple areolar complex - suspicious for involvement.  - 3. Multiple additional suspicious hypoechoic masses noted on prior R Dx US on 4/20/23, R RA calcs on outside report - (Rec USGBx of R 8N4 mass - most distant to index lesion and Stereo bx of R RA calcs if it would change remedios)  - 4. Morphologically abnormal appearing R level 1 and 2  axillary LN and few enlarged L level 1 axillary LNs ( Rec Dx US, possible USGBx) - 5.  Enlarged R IM LN ( Rec PET)   5/31/23 B/L Dx US:  - R prev bx'ed 1.1 cm LN w/ clip, Additional R 1.7 cm axillary LN w/ mildly thickened cortex measuring 4-5 mm (decision made to bx) - L axillary 2.2 cm LN w/ mildly thickened cortex, additional : axillary 1.8 cm axillary LN w/ eccentrically thickened cortex (decision made to bx), L superficial hypoechoic mass and L superficial echogenic mass w/ dermal tracts most c/w sebaceous or epidermal inclusion cysts   5/31/23 B/L USGBx 1. Left axillary core bx: Small fragment of benign lymphoid tissue c/w LN. Benign and concordant. Twirl Clip 2. Right axillary core bx: Fragment of benign lymphoid tissue c/w LN.  Benign and concordant Twirl clip   6/8/23 PET:  1. FGD avid R breast mass corresp to known malignancy  2. Minimally FDG- avid prominent B/L axillary LN are compatible w/ recent benign biopsies  3. FDG-avid uterine fibroid and small focus of FDG activity in Left ovary may be physiologic. (Rec Pelvis US for further eval 4. L lower thoracic paraspinal muscle hypermetabolism may reflect inflammation or muscle spasm ( Correlate clinically)  5. Remainder of study demonstrated no evidence of Mets   7/5/2023 s/p R SSM, R SLNBx (0/17), L NSM with LARISA reconstruction (Dr. Solomon) at [facility] ~ ypT2 pN0 1. L mastectomy: IDP, FCC.  2. R mastectomy: IDC (G3) w/ focal microcalcs to be 3.5 cm involving the posterior (largest focus is 2mm) and anterior margin focally (focus <1 mm). DCIS (G2-3) w/ microcalcs extends over an 8.0 cm area. (0/2 LN negative for carcinoma).   2/6/24 Left UOQ tissue bx: Fragments of inflamed, ruptured epidermal cyst and inflamed granulation tissue.  2/12/24 R Dx US: BIRADs 1. No sonographic evidence of malignancy or findings to explain patients right breast pain. (Rec clinical f/u)

## 2025-02-27 NOTE — PHYSICAL EXAM
[NI] : Normal [de-identified] : Right breast nipple healed well no sign of infection no erythema.  Left breast healing well fat grafting taken no sign of infection Left breast bigger than right breast and left nipple bigger than right nipple.    [de-identified] : Abdomen healing well no sign of infection small dog ears bilaterally.

## 2025-02-27 NOTE — REASON FOR VISIT
[Post Op: _________] : a [unfilled] post op visit [FreeTextEntry1] :  Patient is a 38-year-old female s/p: -revision bilateral breasts-Rt nipple recon-FG Lt breast, dos:12.6.24. Patient reports she is doing well, she denies having bleeding, drainage, fever or chills.

## 2025-03-12 NOTE — HISTORY OF PRESENT ILLNESS
[de-identified] : The patient's history of present illness began when she first palpated a right upper breast lump sometime in February 2023.  She reports having a primary care routine visit scheduled for March 2023 and at that time she was referred for a diagnostic mammogram and sonogram.  These were performed on 04/20/2023 with the mammogram finding bilateral heterogeneously dense breasts; no suspicious findings were noted in the left breast; there was global asymmetry involving the right breast; in the area of the palpable concern, there was an isodense mass that was microlobulated and containing calcifications; additionally in close proximity, there was another area of architectural distortion with calcifications; there were microcalcifications at the anterior right retroareolar region that appeared to be a group of amorphous microcalcifications of note.  A bilateral breast ultrasound performed on that same date noted in the right breast in the area of palpable concern at the 1 o'clock axis, 3 centimeters from nipple, there was an irregular hypoechoic mass with angular margins measuring 2 x 1 x 2.1 centimeters, indeterminate; additionally at the 1 o'clock axis 2 centimeters from nipple, there was an additional hypoechoic irregular mass that measured 1.7 x 0.9 x 1.6 centimeters; there were multiple other round hypoechoic avascular masses measuring less than 1 centimeter throughout the right breast; there was a right axillary lymph node with cortical thickness of 0.4 centimeters.  An ultrasound sound guided core biopsy of the breast masses and axillary lymph node was recommended.  The patient went on to have an ultrasound-guided core biopsy of the right breast 1 o'clock axis lesion 3 centimeters from nipple with a finding of invasive moderately differentiated ductal carcinoma measuring 13 millimeters, with evidence of DCIS, estrogen receptor positive (greater than 95 percent), progesterone receptor positive (greater than 95 percent), and HER-2/prieto equivocal (2+) with confirmatory FISH returning non amplified/negative. The right axillary lymph node core biopsy returned with lymph node negative for carcinoma.     The patient went on to have a bilateral breast MRI on 05/19/2023 with a finding of marked background enhancement limiting sensitivity of bilateral breasts; in the right breast, contiguous multiple suspicious masses and regional area of non mass enhancement predominantly involving the upper outer and upper central breast with extension to the lower central and lower breast measuring at least 5.8 x 5.2 x 8.2 centimeters with a biopsy marker in the cranial aspect of the 1 o'clock lesions corresponding to biopsy-proven cancer; additionally there was asymmetric enhancement of the right nipple concerning for disease extension into the nipple-areolar complex; there was non mass enhancement abutting the pectoralis muscle without enhancement of the muscle itself or chest wall to suggest involvement; there are other scattered enhancing nonspecific foci; in the left breast, there were scattered enhancing nonspecific foci but no suspicious enhancement of the left breast; there were abnormal right axillary level 1 and level 2 lymph nodes with round morphology and effaced fatty hilum with the largest level 1 lymph node containing a biopsy marker measuring 1.4 centimeters with additional enlarged left axillary 1 lymph nodes ranging in size from 1.4-1.6 centimeters; there was an enlarged 8 millimeter right internal mammary lymph node; there was also a fat containing left internal mammary lymph node.  The patient was seen in consultation on 6/8/23 regarding further treatment recommendations.    The patient has had an [employer] Diagnostic Cancer Genetic Predisposition Panel Test done with a finding of no pathogenic mutations, but multiple variants of unknown significance.  I had an extensive discussion with Ms. Ambriz regarding her newly diagnosed breast cancer, clinical T3 N0 Mx, clinical stage 2A breast cancer, noting the implications of a clinical and radiologic presentation on subsequent risk of developing local and metastatic recurrence.   In light of the above, I recommended proceeding with primary breast surgery at this time, and the patient noted she has already decided to have bilateral mastectomies.  I have noted that if she is surgically operable, which I believe she is at this time, that doing so may provide us more information regarding definitive pathologic prognostic factors which may then drive decision-making as to her potential risk of metastatic recurrence and recommendations for potential adjuvant therapies.  I have noted that should she have a tumor less than 5 centimeters, and lymph node negative, I would consider having her tumor sent off for Oncotype DX testing, having discussed this molecular profiling tool and its role in decision-making as to whether to add adjuvant chemotherapy to adjuvant antiestrogen therapy.  Should she have any lymph node involvement, I have noted that per the RxPonder study, all patients would be recommended to undergo adjuvant chemotherapy and consequently would not send that study off.  I have noted that should she have any of the above that would be an indication for chemotherapy, that I would deliver adjuvant chemotherapy to be determined based on her pathologic findings to potentially decrease the risk of metastatic recurrence.  This would then be followed by adjuvant antiestrogen therapy with Lupron to render her chemically menopausal followed by an aromatase inhibitor such as anastrozole.  The potential risks, benefits, anticipated side effects were preliminarily discussed.  I have noted that should she have node-negative cancer, and have a low risk score on Oncotype DX testing, primary adjuvant antiestrogen therapy alone would be considered.   I asked the patient in advance if percarmandoce she requires adjuvant chemotherapy, whether she wishes any further childbearing and she has replied no and consequently will not refer her for fertility preservation consultation at this time.   I also noted that should her PET-CT scan return with evidence of metastatic disease, above recommendations would defer greatly.  I also discussed the potential role of adjuvant radiation therapy, even in the postmastectomy setting in some patients, having outlined preliminarily the criteria for doing so.  I have deferred definitive adjuvant radiation therapy recommendations to a radiation oncologist in the future.  She went on to have a R skin sparing MRM an dL breast nipple sparing MRM with LARISA reconstruction. Path: 1- Left breast 2- Right breast 3- Right axillary sentinel lymph node 1 4- Right axillary sentinel lymph node 2 5- Right axillary sentinel lymph node 3 6- Right internal mammary lymph node 7- Left internal mammary lymph node  Final Diagnosis  1. Breast, left, mastectomy - Indtraductal papilloma - Proliferative fibrocystic change  2. Breast, right, mastectomy - Invasive moderate to poorly differentiated ductal carcinoma with focal microcalcifications (size: 35 mm), see synoptic summary - Ductal carcinoma in situ (DCIS), solid, cribriform, papillary and micropapillary types with intermediate to high nuclear grade, central necrosis and microcalcifications - Two lymph nodes, negative for carcinoma - Biopsy site change  Note: DCIS extends over a 80  mm area. Invasive carcinoma involves the inked cauterized posterior margin in 2 blocks (largest contiguous focus of involvement measures 2 mm) and inked cauterized anterior margin (focus measures < 1 mm ).  3. Lymph node, right axillary sentinel lymph node 1, excision - Four lymph nodes, negative for carcinoma  4. Lymph node, right axillary sentinel lymph node 2, excision - Three lymph nodes negative for carcinoma  5. Lymph node, right axillary sentinel lymph node 3, excision - Seven lymph nodes negative for carcinoma  6. Lymph node, right internal mammary, excision - One lymph node negative for carcinoma   7. Lymph node, left internal mammary, excision - Fibroadipose tissue with no significant histopathological change - No lymphoid tissue identified  2/2024 She is transferring care from Dr. Morales, first visit with me today.  Chart reviewed. Patient reports that she never started tamoxifen.  She is extremely concerned about endometrial toxicity and some of the other side effects related to tamoxifen.  She is under the impression that her cancer was low-grade and endocrine therapy is not necessary.  We reviewed pathology report in great detail.  We reviewed Oncotype results.  I explained the patient that chemotherapy  would have a marginal 1.6% benefit is not strongly recommended.  Endocrine therapy is strongly recommended due to strong ER/OR positivity and a relatively large tumor size.  Tamoxifen side effects were reviewed in detail.  She is very anxious about tamoxifen toxicity.  She reports very heavy menstrual periods.  We discussed treatment with ovarian suppression and anastrozole.  Patient is interested in ovarian suppression.  Side effects of Lupron and exemestane were reviewed.  She will start Lupron next week.  I will see her after 3-4 doses of Lupron to start AI.  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  We will monitor closely if ovarian suppression helps with inflammation and cysts.  Periods are regular, LMP 2/2024,  using condoms, stopped OCP after dx. Has 2 kids - 15 yrs and 5 yrs. Using condoms. Doesnt want more kids  11/20/2024 This is my second visit with her.  At first visit 2/2024 I strongly recommended to start endocrine therapy for curative intent.  At that time patient seemed to be in favor of Lupron plus AI but did not return to office for injections or appointments.  She was lost to follow-up.  Today she returned to office because she has breast pain.  She has been undergoing cosmetic procedures.  She is concerned about cancer recurrence.  She is requesting a PET scan.  She met with Dr. Casey (GYN) and wanted to get BSO.  Dr. Casey encouraged her to see me for a follow-up to discuss cancer directed therapy.  Patient reports that she has been speaking to people and has been reading reviews "to get insight about cancer treatment".  She did not start cancer treatment in February 2024 as she was concerned about side effects. Patient stated that she is "healthy" and she " had" cancer.  We reviewed pathology report in detail.  She had T2, grade 3 tumor with Oncotype DX of 18.  Chemotherapy had 1.6% benefit and was not offered.  Endocrine therapy was recommended by Dr. Morales (initial consult) and by me (second visit)  We discussed that there is a high likelihood of cancer recurrence and distant metastatic spread  leading to untimely death without adjuvant therapy.  Patient understood what was explained to her but was not quite convinced to start treatment.  I strongly encouraged her to consider second medical oncology opinion.  I also offered to call her spouse to explain that noncompliance with cancer treatment can lead to untimely death.  She did not want me to reach out to her spouse and stated  is on her side. She has very heavy menstrual bleeding.  We discussed side effects of tamoxifen as well as OS plus AI in detail.  I will favor OS plus AI given her young age of diagnosis and high risk disease.  She seems to be in favor of ovarian suppression but is also concerned about cardiac and bone toxicity.  We discussed cardio oncology and endocrinology management of side effects.   We also discussed recent approval of ribociclib for patients with high risk breast cancer.  Patient meets the criteria for adjuvant ribociclib (T2 N0 grade 3 tumor).   Despite a long discussion, patient seems to have conflicted opinion about adjuvant endocrine therapy We discussed to obtain a PET/CT and, follow-up with me in 1 to 2 weeks.   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  We will monitor closely if ovarian suppression helps with inflammation and cysts.  Periods are regular, LMP 11/2024,  using condoms, stopped OCP after dx. Has 2 kids - 15 yrs and 5 yrs. Using condoms. Doesnt want more kids   1/2025 CT CAP and bone scan 1/2025 JAMILA Here to start lupron today S/e reviewed AI to start 3/2025, ribo 4/2025 pt in agreement   We discussed cardio oncology and endocrinology management of side effects.   We also discussed recent approval of ribociclib for patients with high risk breast cancer.  Patient meets the criteria for adjuvant ribociclib (T2 N0 grade 3 tumor).  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  We will monitor closely if ovarian suppression helps with inflammation and cysts.   [de-identified] : This is a very pleasant 37-year-old premenopausal lady diagnosed with stage IA T2 N0 MX ER/VT strongly positive HER2/prieto negative poorly differentiated invasive ductal carcinoma, status post bilateral mastectomies with reconstruction 7/2023, completed radiation 11/2023 (margin issue).  BRCA panel test negative.  Oncotype DX 18.  She met with Dr. Morales 9/2023 and tamoxifen was prescribed. Patient never started tamoxifen and saw me for first visit 2/2024.  We reviewed pathology and strongly recommended endocrine therapy.  Options for tamoxifen versus OS plus AI reviewed. CT JAMILA 1/1025. Lupron started 1/2025. AI 3/2025  3/12/2025 She is here for lupron # 3  LMP 1/2025 She has mild muscle and joint issues. She is walking on treadmill x 60 min, 5 days a week mild hot flashes  discussed to start AI  I recommend Arimidex 1 mg daily for 5-10 years. I discussed the risks and benefits of aromatase inhibitor therapy including fatigue, coronary artery disease, hyperlipidemia, vaginal dryness, mood changes, hot flashes, GI disturbances, arthralgias, myalgias, and osteoporosis. I will obtain bone density scan to evaluate her bone health prior to starting anastrozole. I recommend her to continue calcium and vitamin D supplementation.  We will see her back in 2 months to start ribociclib at next visit. Will check blood work including hormone levels at next visit Will make a referral for cardio oncology and bone density in the next 6 to 12 months. Periods are regular, LMP 1/2025  using condoms, stopped OCP after dx. Has 2 kids - 15 yrs and 5 yrs. Using condoms. Doesnt want more kids

## 2025-03-12 NOTE — CONSULT LETTER
[Dear  ___] : Dear  [unfilled], [Courtesy Letter:] : I had the pleasure of seeing your patient, [unfilled], in my office today. [Please see my note below.] : Please see my note below. [Consult Closing:] : Thank you very much for allowing me to participate in the care of this patient.  If you have any questions, please do not hesitate to contact me. [Sincerely,] : Sincerely, [FreeTextEntry3] : Sita Sol MD Attending Physician, Division of Medical Oncology and Hematology Medical Director, [facility] Pregnancy and Reproduction Medical Director, Breast Wellness and Cancer Prevention Program  [facility] [place] , [employer] at [facility]

## 2025-03-12 NOTE — ASSESSMENT
[FreeTextEntry1] : This is a very pleasant 37-year-old premenopausal lady diagnosed with stage IA T2 N0 MX ER/MN strongly positive HER2/prieto negative moderate to poorly differentiated invasive ductal carcinoma, status post bilateral mastectomies with reconstruction 7/2023, completed radiation 11/2023 (margin issue).  BRCA panel test negative.  Oncotype DX 18.  She met with Dr. Morales 9/2023 and tamoxifen was prescribed but pt never started. CT JAMILA 1/1025. Lupron started 1/2025. AI 3/2025  3/12/2025 She is here for lupron # 3  LMP 1/2025 She has mild muscle and joint issues. She is walking on treadmill x 60 min, 5 days a week mild hot flashes  discussed to start AI  I recommend Arimidex 1 mg daily for 5-10 years. I discussed the risks and benefits of aromatase inhibitor therapy including fatigue, coronary artery disease, hyperlipidemia, vaginal dryness, mood changes, hot flashes, GI disturbances, arthralgias, myalgias, and osteoporosis. I will obtain bone density scan to evaluate her bone health prior to starting anastrozole. I recommend her to continue calcium and vitamin D supplementation.  We will see her back in 2 months to start ribociclib at next visit. Will check blood work including hormone levels at next visit Will make a referral for cardio oncology and bone density in the next 6 to 12 months. Periods are regular, LMP 1/2025  using condoms, stopped OCP after dx. Has 2 kids - 15 yrs and 5 yrs. Using condoms. Doesnt want more kids rto 2m  continue lupron every month

## 2025-03-12 NOTE — PHYSICAL EXAM
[Fully active, able to carry on all pre-disease performance without restriction] : Status 0 - Fully active, able to carry on all pre-disease performance without restriction [Normal] : affect appropriate [de-identified] : s/p B/L MRM with LARISA reconstruction - well healed scars, no palp masses. B/L ax neg [de-identified] : Multiple subcentimeter cysts on anterior chest and arms

## 2025-04-14 NOTE — HISTORY OF PRESENT ILLNESS
[de-identified] : 38 year old F presenting for CPE. No complaints at present. Notes pain in her right leg, behind her knee, ange. upon flexion. 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   Diagnosed with ER/CT (+), HER2 (-) right breast cancer in Apr 2023. She is s/p bilateral mastectomy, July 2023. Radiation therapy to the chest wall completed October 2023. Now follows with Dr. Sita Sol (med onc). Currently on Lupron and anastrazole. Noted heavy bleeding a few weeks ago (end March 2025) and was advised could stop anastrazole but bleeding resolved, and she is continuing anastrazole.

## 2025-04-14 NOTE — HISTORY OF PRESENT ILLNESS
[de-identified] : 38 year old F presenting for CPE. No complaints at present. Notes pain in her right leg, behind her knee, ange. upon flexion. 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   Diagnosed with ER/MT (+), HER2 (-) right breast cancer in Apr 2023. She is s/p bilateral mastectomy, July 2023. Radiation therapy to the chest wall completed October 2023. Now follows with Dr. Sita Sol (med onc). Currently on Lupron and anastrazole. Noted heavy bleeding a few weeks ago (end March 2025) and was advised could stop anastrazole but bleeding resolved, and she is continuing anastrazole.

## 2025-04-14 NOTE — COUNSELING
[Benefits of weight loss discussed] : Benefits of weight loss discussed [Encouraged to increase physical activity] : Encouraged to increase physical activity [Weigh Self Weekly] : weigh self weekly [Decrease Portions] : decrease portions [____ min/wk Activity] : [unfilled] min/wk activity [Keep Food Diary] : keep food diary [Good understanding] : Patient has a good understanding of disease, goals and obesity follow-up plan [FreeTextEntry4] : 15

## 2025-04-14 NOTE — HEALTH RISK ASSESSMENT
[Very Good] : ~his/her~  mood as very good [Yes] : Yes [Monthly or less (1 pt)] : Monthly or less (1 point) [1 or 2 (0 pts)] : 1 or 2 (0 points) [Never (0 pts)] : Never (0 points) [No] : In the past 12 months have you used drugs other than those required for medical reasons? No [No falls in past year] : Patient reported no falls in the past year [Little interest or pleasure doing things] : 1) Little interest or pleasure doing things [Feeling down, depressed, or hopeless] : 2) Feeling down, depressed, or hopeless [0] : 2) Feeling down, depressed, or hopeless: Not at all (0) [PHQ-2 Negative - No further assessment needed] : PHQ-2 Negative - No further assessment needed [Patient reported mammogram was abnormal] : Patient reported mammogram was abnormal [Patient reported PAP Smear was normal] : Patient reported PAP Smear was normal [HIV test declined] : HIV test declined [Hepatitis C test declined] : Hepatitis C test declined [With Family] : lives with family [Employed] : employed [] :  [# Of Children ___] : has [unfilled] children [Sexually Active] : sexually active [Feels Safe at Home] : Feels safe at home [Fully functional (bathing, dressing, toileting, transferring, walking, feeding)] : Fully functional (bathing, dressing, toileting, transferring, walking, feeding) [Fully functional (using the telephone, shopping, preparing meals, housekeeping, doing laundry, using] : Fully functional and needs no help or supervision to perform IADLs (using the telephone, shopping, preparing meals, housekeeping, doing laundry, using transportation, managing medications and managing finances) [Smoke Detector] : smoke detector [Carbon Monoxide Detector] : carbon monoxide detector [Safety elements used in home] : safety elements used in home [Seat Belt] :  uses seat belt [Never] : Never [NO] : No [Time Spent: ___ Minutes] : I spent [unfilled] minutes performing a depression screening for this patient. [FreeTextEntry1] : physical [de-identified] : no [de-identified] : oncologist every 2-3 months breast cancer  [Audit-CScore] : 1 [de-identified] : walking  [de-identified] : good  [[id number]] : 0 [Change in mental status noted] : No change in mental status noted [High Risk Behavior] : no high risk behavior [Reports changes in hearing] : Reports no changes in hearing [Reports changes in vision] : Reports no changes in vision [Reports changes in dental health] : Reports no changes in dental health [Sunscreen] : does not use sunscreen [Travel to Developing Areas] : does not  travel to developing areas [TB Exposure] : is not being exposed to tuberculosis [Caregiver Concerns] : does not have caregiver concerns [MammogramDate] : 04/2023 [MammogramComments] : found out she had breast cancer  [PapSmearDate] : 08/2023

## 2025-04-14 NOTE — HEALTH RISK ASSESSMENT
[Very Good] : ~his/her~  mood as very good [Yes] : Yes [Monthly or less (1 pt)] : Monthly or less (1 point) [1 or 2 (0 pts)] : 1 or 2 (0 points) [Never (0 pts)] : Never (0 points) [No] : In the past 12 months have you used drugs other than those required for medical reasons? No [No falls in past year] : Patient reported no falls in the past year [Little interest or pleasure doing things] : 1) Little interest or pleasure doing things [Feeling down, depressed, or hopeless] : 2) Feeling down, depressed, or hopeless [0] : 2) Feeling down, depressed, or hopeless: Not at all (0) [PHQ-2 Negative - No further assessment needed] : PHQ-2 Negative - No further assessment needed [Patient reported mammogram was abnormal] : Patient reported mammogram was abnormal [Patient reported PAP Smear was normal] : Patient reported PAP Smear was normal [HIV test declined] : HIV test declined [Hepatitis C test declined] : Hepatitis C test declined [With Family] : lives with family [Employed] : employed [] :  [# Of Children ___] : has [unfilled] children [Sexually Active] : sexually active [Feels Safe at Home] : Feels safe at home [Fully functional (bathing, dressing, toileting, transferring, walking, feeding)] : Fully functional (bathing, dressing, toileting, transferring, walking, feeding) [Fully functional (using the telephone, shopping, preparing meals, housekeeping, doing laundry, using] : Fully functional and needs no help or supervision to perform IADLs (using the telephone, shopping, preparing meals, housekeeping, doing laundry, using transportation, managing medications and managing finances) [Smoke Detector] : smoke detector [Carbon Monoxide Detector] : carbon monoxide detector [Safety elements used in home] : safety elements used in home [Seat Belt] :  uses seat belt [Never] : Never [NO] : No [Time Spent: ___ Minutes] : I spent [unfilled] minutes performing a depression screening for this patient. [FreeTextEntry1] : physical [de-identified] : no [de-identified] : oncologist every 2-3 months breast cancer  [Audit-CScore] : 1 [de-identified] : walking  [de-identified] : good  [SFR2Ffskx] : 0 [Change in mental status noted] : No change in mental status noted [High Risk Behavior] : no high risk behavior [Reports changes in hearing] : Reports no changes in hearing [Reports changes in vision] : Reports no changes in vision [Reports changes in dental health] : Reports no changes in dental health [Sunscreen] : does not use sunscreen [Travel to Developing Areas] : does not  travel to developing areas [TB Exposure] : is not being exposed to tuberculosis [Caregiver Concerns] : does not have caregiver concerns [MammogramDate] : 04/2023 [MammogramComments] : found out she had breast cancer  [PapSmearDate] : 08/2023

## 2025-04-29 NOTE — PHYSICAL EXAM
[NI] : Normal [de-identified] : Right breast nipple healed well no sign of infection no erythema.  Left breast healing well fat grafting taken small hollow area superior portion of breast. Left breast bigger than right breast and left nipple bigger than right nipple.    [de-identified] : Abdomen healing well no sign of infection small dog ears bilaterally.

## 2025-04-29 NOTE — HISTORY OF PRESENT ILLNESS
[FreeTextEntry1] : Pt s/p right breast nipple reconstruction and left breast fat grafting.  Pt doing well no complaints.  Here to discuss upcoming revisions

## 2025-04-29 NOTE — REASON FOR VISIT
[Follow-Up: _____] : a [unfilled] follow-up visit [FreeTextEntry1] :  Patient is a 38-year-old female s/p revision bilateral breasts, right nipple reconstruction and FG left breast (DOS: 12/06/2024). Patient reports she is doing well, she denies having bleeding, drainage, fever or chills.

## 2025-04-29 NOTE — PHYSICAL EXAM
[NI] : Normal [de-identified] : Right breast nipple healed well no sign of infection no erythema.  Left breast healing well fat grafting taken small hollow area superior portion of breast. Left breast bigger than right breast and left nipple bigger than right nipple.    [de-identified] : Abdomen healing well no sign of infection small dog ears bilaterally.

## 2025-05-14 NOTE — PHYSICAL EXAM
[Fully active, able to carry on all pre-disease performance without restriction] : Status 0 - Fully active, able to carry on all pre-disease performance without restriction [Normal] : affect appropriate [de-identified] : s/p B/L MRM with LARISA reconstruction - well healed scars, no palp masses. B/L ax neg [de-identified] : Multiple subcentimeter cysts on anterior chest and arms

## 2025-05-14 NOTE — PHYSICAL EXAM
[Fully active, able to carry on all pre-disease performance without restriction] : Status 0 - Fully active, able to carry on all pre-disease performance without restriction [Normal] : affect appropriate [de-identified] : s/p B/L MRM with LARISA reconstruction - well healed scars, no palp masses. B/L ax neg [de-identified] : Multiple subcentimeter cysts on anterior chest and arms

## 2025-05-14 NOTE — HISTORY OF PRESENT ILLNESS
[de-identified] : The patient's history of present illness began when she first palpated a right upper breast lump sometime in February 2023.  She reports having a primary care routine visit scheduled for March 2023 and at that time she was referred for a diagnostic mammogram and sonogram.  These were performed on 04/20/2023 with the mammogram finding bilateral heterogeneously dense breasts; no suspicious findings were noted in the left breast; there was global asymmetry involving the right breast; in the area of the palpable concern, there was an isodense mass that was microlobulated and containing calcifications; additionally in close proximity, there was another area of architectural distortion with calcifications; there were microcalcifications at the anterior right retroareolar region that appeared to be a group of amorphous microcalcifications of note.  A bilateral breast ultrasound performed on that same date noted in the right breast in the area of palpable concern at the 1 o'clock axis, 3 centimeters from nipple, there was an irregular hypoechoic mass with angular margins measuring 2 x 1 x 2.1 centimeters, indeterminate; additionally at the 1 o'clock axis 2 centimeters from nipple, there was an additional hypoechoic irregular mass that measured 1.7 x 0.9 x 1.6 centimeters; there were multiple other round hypoechoic avascular masses measuring less than 1 centimeter throughout the right breast; there was a right axillary lymph node with cortical thickness of 0.4 centimeters.  An ultrasound sound guided core biopsy of the breast masses and axillary lymph node was recommended.  The patient went on to have an ultrasound-guided core biopsy of the right breast 1 o'clock axis lesion 3 centimeters from nipple with a finding of invasive moderately differentiated ductal carcinoma measuring 13 millimeters, with evidence of DCIS, estrogen receptor positive (greater than 95 percent), progesterone receptor positive (greater than 95 percent), and HER-2/prieto equivocal (2+) with confirmatory FISH returning non amplified/negative. The right axillary lymph node core biopsy returned with lymph node negative for carcinoma.     The patient went on to have a bilateral breast MRI on 05/19/2023 with a finding of marked background enhancement limiting sensitivity of bilateral breasts; in the right breast, contiguous multiple suspicious masses and regional area of non mass enhancement predominantly involving the upper outer and upper central breast with extension to the lower central and lower breast measuring at least 5.8 x 5.2 x 8.2 centimeters with a biopsy marker in the cranial aspect of the 1 o'clock lesions corresponding to biopsy-proven cancer; additionally there was asymmetric enhancement of the right nipple concerning for disease extension into the nipple-areolar complex; there was non mass enhancement abutting the pectoralis muscle without enhancement of the muscle itself or chest wall to suggest involvement; there are other scattered enhancing nonspecific foci; in the left breast, there were scattered enhancing nonspecific foci but no suspicious enhancement of the left breast; there were abnormal right axillary level 1 and level 2 lymph nodes with round morphology and effaced fatty hilum with the largest level 1 lymph node containing a biopsy marker measuring 1.4 centimeters with additional enlarged left axillary 1 lymph nodes ranging in size from 1.4-1.6 centimeters; there was an enlarged 8 millimeter right internal mammary lymph node; there was also a fat containing left internal mammary lymph node.  The patient was seen in consultation on 6/8/23 regarding further treatment recommendations.    The patient has had an [facility] Diagnostic Cancer Genetic Predisposition Panel Test done with a finding of no pathogenic mutations, but multiple variants of unknown significance.  I had an extensive discussion with Ms. Ambriz regarding her newly diagnosed breast cancer, clinical T3 N0 Mx, clinical stage 2A breast cancer, noting the implications of a clinical and radiologic presentation on subsequent risk of developing local and metastatic recurrence.   In light of the above, I recommended proceeding with primary breast surgery at this time, and the patient noted she has already decided to have bilateral mastectomies.  I have noted that if she is surgically operable, which I believe she is at this time, that doing so may provide us more information regarding definitive pathologic prognostic factors which may then drive decision-making as to her potential risk of metastatic recurrence and recommendations for potential adjuvant therapies.  I have noted that should she have a tumor less than 5 centimeters, and lymph node negative, I would consider having her tumor sent off for Oncotype DX testing, having discussed this molecular profiling tool and its role in decision-making as to whether to add adjuvant chemotherapy to adjuvant antiestrogen therapy.  Should she have any lymph node involvement, I have noted that per the RxPonder study, all patients would be recommended to undergo adjuvant chemotherapy and consequently would not send that study off.  I have noted that should she have any of the above that would be an indication for chemotherapy, that I would deliver adjuvant chemotherapy to be determined based on her pathologic findings to potentially decrease the risk of metastatic recurrence.  This would then be followed by adjuvant antiestrogen therapy with Lupron to render her chemically menopausal followed by an aromatase inhibitor such as anastrozole.  The potential risks, benefits, anticipated side effects were preliminarily discussed.  I have noted that should she have node-negative cancer, and have a low risk score on Oncotype DX testing, primary adjuvant antiestrogen therapy alone would be considered.   I asked the patient in advance if percarmandoce she requires adjuvant chemotherapy, whether she wishes any further childbearing and she has replied no and consequently will not refer her for fertility preservation consultation at this time.   I also noted that should her PET-CT scan return with evidence of metastatic disease, above recommendations would defer greatly.  I also discussed the potential role of adjuvant radiation therapy, even in the postmastectomy setting in some patients, having outlined preliminarily the criteria for doing so.  I have deferred definitive adjuvant radiation therapy recommendations to a radiation oncologist in the future.  She went on to have a R skin sparing MRM an dL breast nipple sparing MRM with LARISA reconstruction. Path: 1- Left breast 2- Right breast 3- Right axillary sentinel lymph node 1 4- Right axillary sentinel lymph node 2 5- Right axillary sentinel lymph node 3 6- Right internal mammary lymph node 7- Left internal mammary lymph node  Final Diagnosis  1. Breast, left, mastectomy - Indtraductal papilloma - Proliferative fibrocystic change  2. Breast, right, mastectomy - Invasive moderate to poorly differentiated ductal carcinoma with focal microcalcifications (size: 35 mm), see synoptic summary - Ductal carcinoma in situ (DCIS), solid, cribriform, papillary and micropapillary types with intermediate to high nuclear grade, central necrosis and microcalcifications - Two lymph nodes, negative for carcinoma - Biopsy site change  Note: DCIS extends over a 80  mm area. Invasive carcinoma involves the inked cauterized posterior margin in 2 blocks (largest contiguous focus of involvement measures 2 mm) and inked cauterized anterior margin (focus measures < 1 mm ).  3. Lymph node, right axillary sentinel lymph node 1, excision - Four lymph nodes, negative for carcinoma  4. Lymph node, right axillary sentinel lymph node 2, excision - Three lymph nodes negative for carcinoma  5. Lymph node, right axillary sentinel lymph node 3, excision - Seven lymph nodes negative for carcinoma  6. Lymph node, right internal mammary, excision - One lymph node negative for carcinoma   7. Lymph node, left internal mammary, excision - Fibroadipose tissue with no significant histopathological change - No lymphoid tissue identified  2/2024 She is transferring care from Dr. Morales, first visit with me today.  Chart reviewed. Patient reports that she never started tamoxifen.  She is extremely concerned about endometrial toxicity and some of the other side effects related to tamoxifen.  She is under the impression that her cancer was low-grade and endocrine therapy is not necessary.  We reviewed pathology report in great detail.  We reviewed Oncotype results.  I explained the patient that chemotherapy  would have a marginal 1.6% benefit is not strongly recommended.  Endocrine therapy is strongly recommended due to strong ER/W[place] positivity and a relatively large tumor size.  Tamoxifen side effects were reviewed in detail.  She is very anxious about tamoxifen toxicity.  She reports very heavy menstrual periods.  We discussed treatment with ovarian suppression and anastrozole.  Patient is interested in ovarian suppression.  Side effects of Lupron and exemestane were reviewed.  She will start Lupron next week.  I will see her after 3-4 doses of Lupron to start AI.  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  We will monitor closely if ovarian suppression helps with inflammation and cysts.  Periods are regular, LMP 2/2024,  using condoms, stopped OCP after dx. Has 2 kids - 15 yrs and 5 yrs. Using condoms. Doesnt want more kids  11/20/2024 This is my second visit with her.  At first visit 2/2024 I strongly recommended to start endocrine therapy for curative intent.  At that time patient seemed to be in favor of Lupron plus AI but did not return to office for injections or appointments.  She was lost to follow-up.  Today she returned to office because she has breast pain.  She has been undergoing cosmetic procedures.  She is concerned about cancer recurrence.  She is requesting a PET scan.  She met with Dr. Casey (GYN) and wanted to get BSO.  Dr. Casey encouraged her to see me for a follow-up to discuss cancer directed therapy.  Patient reports that she has been speaking to people and has been reading reviews "to get insight about cancer treatment".  She did not start cancer treatment in February 2024 as she was concerned about side effects. Patient stated that she is "healthy" and she " had" cancer.  We reviewed pathology report in detail.  She had T2, grade 3 tumor with Oncotype DX of 18.  Chemotherapy had 1.6% benefit and was not offered.  Endocrine therapy was recommended by Dr. Morales (initial consult) and by me (second visit)  We discussed that there is a high likelihood of cancer recurrence and distant metastatic spread  leading to untimely death without adjuvant therapy.  Patient understood what was explained to her but was not quite convinced to start treatment.  I strongly encouraged her to consider second medical oncology opinion.  I also offered to call her spouse to explain that noncompliance with cancer treatment can lead to untimely death.  She did not want me to reach out to her spouse and stated  is on her side. She has very heavy menstrual bleeding.  We discussed side effects of tamoxifen as well as OS plus AI in detail.  I will favor OS plus AI given her young age of diagnosis and high risk disease.  She seems to be in favor of ovarian suppression but is also concerned about cardiac and bone toxicity.  We discussed cardio oncology and endocrinology management of side effects.   We also discussed recent approval of ribociclib for patients with high risk breast cancer.  Patient meets the criteria for adjuvant ribociclib (T2 N0 grade 3 tumor).   Despite a long discussion, patient seems to have conflicted opinion about adjuvant endocrine therapy We discussed to obtain a PET/CT and, follow-up with me in 1 to 2 weeks.   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  We will monitor closely if ovarian suppression helps with inflammation and cysts.  Periods are regular, LMP 11/2024,  using condoms, stopped OCP after dx. Has 2 kids - 15 yrs and 5 yrs. Using condoms. Doesnt want more kids   1/2025 CT CAP and bone scan 1/2025 JAMILA Here to start lupron today S/e reviewed AI to start 3/2025, ribo 4/2025 pt in agreement   We discussed cardio oncology and endocrinology management of side effects.   We also discussed recent approval of ribociclib for patients with high risk breast cancer.  Patient meets the criteria for adjuvant ribociclib (T2 N0 grade 3 tumor).  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  We will monitor closely if ovarian suppression helps with inflammation and cysts.   [de-identified] : This is a very pleasant 37-year-old premenopausal lady diagnosed with stage IA T2 N0 MX ER/KS strongly positive HER2/prieto negative poorly differentiated invasive ductal carcinoma, status post bilateral mastectomies with reconstruction 7/2023, completed radiation 11/2023 (margin issue).  BRCA panel test negative.  Oncotype DX 18.  She met with Dr. Morales 9/2023 and tamoxifen was prescribed. Patient never started tamoxifen and saw me for first visit 2/2024.  We reviewed pathology and strongly recommended endocrine therapy.  Options for tamoxifen versus OS plus AI reviewed. CT JAMILA 1/1025. Lupron started 1/2025. AI 3/2025  3/12/2025 She is here for lupron # 3  LMP 1/2025 She has mild muscle and joint issues. She is walking on treadmill x 60 min, 5 days a week mild hot flashes  discussed to start AI  I recommend Arimidex 1 mg daily for 5-10 years. I discussed the risks and benefits of aromatase inhibitor therapy including fatigue, coronary artery disease, hyperlipidemia, vaginal dryness, mood changes, hot flashes, GI disturbances, arthralgias, myalgias, and osteoporosis. I will obtain bone density scan to evaluate her bone health prior to starting anastrozole. I recommend her to continue calcium and vitamin D supplementation.  We will see her back in 2 months to start ribociclib at next visit. Will check blood work including hormone levels at next visit Will make a referral for cardio oncology and bone density in the next 6 to 12 months. Periods are regular, LMP 1/2025  using condoms, stopped OCP after dx. Has 2 kids - 15 yrs and 5 yrs. Using condoms. Doesnt want more kids  5/2025 She started lupron Jan 2025 She has had 4 injections She is still having regular periods 3/19/25 x 8 days  4/21/2025 x 6 days  estradiol 4/2025 35 She started anastrozole in 3/2025. No s/e If she continues to mensurate despite lupron, would consider BSO or switching to tamoxifen  Estradiol is lower than what is expected at her age, likely 2/2 lupron effect. Would give 2 more months  lupron today and next month f/u with me in 1 m to see if she is still menstruating  Will change to [place] next month if she gets another period this month  Will wait to add ribo until next visit

## 2025-05-14 NOTE — ASSESSMENT
[FreeTextEntry1] : This is a very pleasant 37-year-old premenopausal lady diagnosed with stage IA T2 N0 MX ER/OR strongly positive HER2/prieto negative moderate to poorly differentiated invasive ductal carcinoma, status post bilateral mastectomies with reconstruction 7/2023, completed radiation 11/2023 (margin issue).  BRCA panel test negative.  Oncotype DX 18.  She met with Dr. Morales 9/2023 and tamoxifen was prescribed but pt never started. CT JAMILA 1/1025. Lupron started 1/2025. AI 3/2025  3/12/2025 She is here for lupron # 3  LMP 1/2025 She has mild muscle and joint issues. She is walking on treadmill x 60 min, 5 days a week mild hot flashes  discussed to start AI  I recommend Arimidex 1 mg daily for 5-10 years. I discussed the risks and benefits of aromatase inhibitor therapy including fatigue, coronary artery disease, hyperlipidemia, vaginal dryness, mood changes, hot flashes, GI disturbances, arthralgias, myalgias, and osteoporosis. I will obtain bone density scan to evaluate her bone health prior to starting anastrozole. I recommend her to continue calcium and vitamin D supplementation.  We will see her back in 2 months to start ribociclib at next visit. Will check blood work including hormone levels at next visit Will make a referral for cardio oncology and bone density in the next 6 to 12 months. Periods are regular, LMP 1/2025  using condoms, stopped OCP after dx. Has 2 kids - 15 yrs and 5 yrs. Using condoms. Doesnt want more kids rto 2m  continue lupron every month    5/2025 She started lupron Jan 2025 She has had 4 injections She is still having regular periods 3/19/25 x 8 days  4/21/2025 x 6 days  estradiol 4/2025 35 She started anastrozole in 3/2025. No s/e If she continues to mensurate despite lupron, would consider BSO or switching to tamoxifen  Estradiol is lower than what is expected at her age, likely 2/2 lupron effect. Would give 2 more months  lupron today and next month f/u with me in 1 m to see if she is still menstruating  Will change to [place] next month if she gets another period this month  Will wait to add ribo until next visit

## 2025-05-14 NOTE — ASSESSMENT
[FreeTextEntry1] : This is a very pleasant 37-year-old premenopausal lady diagnosed with stage IA T2 N0 MX ER/FL strongly positive HER2/prieto negative moderate to poorly differentiated invasive ductal carcinoma, status post bilateral mastectomies with reconstruction 7/2023, completed radiation 11/2023 (margin issue).  BRCA panel test negative.  Oncotype DX 18.  She met with Dr. Morales 9/2023 and tamoxifen was prescribed but pt never started. CT JAMILA 1/1025. Lupron started 1/2025. AI 3/2025  3/12/2025 She is here for lupron # 3  LMP 1/2025 She has mild muscle and joint issues. She is walking on treadmill x 60 min, 5 days a week mild hot flashes  discussed to start AI  I recommend Arimidex 1 mg daily for 5-10 years. I discussed the risks and benefits of aromatase inhibitor therapy including fatigue, coronary artery disease, hyperlipidemia, vaginal dryness, mood changes, hot flashes, GI disturbances, arthralgias, myalgias, and osteoporosis. I will obtain bone density scan to evaluate her bone health prior to starting anastrozole. I recommend her to continue calcium and vitamin D supplementation.  We will see her back in 2 months to start ribociclib at next visit. Will check blood work including hormone levels at next visit Will make a referral for cardio oncology and bone density in the next 6 to 12 months. Periods are regular, LMP 1/2025  using condoms, stopped OCP after dx. Has 2 kids - 15 yrs and 5 yrs. Using condoms. Doesnt want more kids rto 2m  continue lupron every month    5/2025 She started lupron Jan 2025 She has had 4 injections She is still having regular periods 3/19/25 x 8 days  4/21/2025 x 6 days  estradiol 4/2025 35 She started anastrozole in 3/2025. No s/e If she continues to mensurate despite lupron, would consider BSO or switching to tamoxifen  Estradiol is lower than what is expected at her age, likely 2/2 lupron effect. Would give 2 more months  lupron today and next month f/u with me in 1 m to see if she is still menstruating  Will change to [place] next month if she gets another period this month  Will wait to add ribo until next visit

## 2025-05-14 NOTE — CONSULT LETTER
[Dear  ___] : Dear  [unfilled], [Courtesy Letter:] : I had the pleasure of seeing your patient, [unfilled], in my office today. [Please see my note below.] : Please see my note below. [Consult Closing:] : Thank you very much for allowing me to participate in the care of this patient.  If you have any questions, please do not hesitate to contact me. [Sincerely,] : Sincerely, [FreeTextEntry3] : Sita Sol MD Attending Physician, Division of Medical Oncology and Hematology Medical Director, Center for Cancer, Pregnancy and Reproduction Medical Director, Breast Wellness and Cancer Prevention Program  [facility] [place] , [employer] at [facility]

## 2025-05-14 NOTE — CONSULT LETTER
[Dear  ___] : Dear  [unfilled], [Courtesy Letter:] : I had the pleasure of seeing your patient, [unfilled], in my office today. [Please see my note below.] : Please see my note below. [Consult Closing:] : Thank you very much for allowing me to participate in the care of this patient.  If you have any questions, please do not hesitate to contact me. [Sincerely,] : Sincerely, [FreeTextEntry3] : Sita Sol MD Attending Physician, Division of Medical Oncology and Hematology Medical Director, Center for Cancer, Pregnancy and Reproduction Medical Director, Breast Wellness and Cancer Prevention Program  [facility] [place] , [employer] at [facility]

## 2025-05-14 NOTE — HISTORY OF PRESENT ILLNESS
[de-identified] : The patient's history of present illness began when she first palpated a right upper breast lump sometime in February 2023.  She reports having a primary care routine visit scheduled for March 2023 and at that time she was referred for a diagnostic mammogram and sonogram.  These were performed on 04/20/2023 with the mammogram finding bilateral heterogeneously dense breasts; no suspicious findings were noted in the left breast; there was global asymmetry involving the right breast; in the area of the palpable concern, there was an isodense mass that was microlobulated and containing calcifications; additionally in close proximity, there was another area of architectural distortion with calcifications; there were microcalcifications at the anterior right retroareolar region that appeared to be a group of amorphous microcalcifications of note.  A bilateral breast ultrasound performed on that same date noted in the right breast in the area of palpable concern at the 1 o'clock axis, 3 centimeters from nipple, there was an irregular hypoechoic mass with angular margins measuring 2 x 1 x 2.1 centimeters, indeterminate; additionally at the 1 o'clock axis 2 centimeters from nipple, there was an additional hypoechoic irregular mass that measured 1.7 x 0.9 x 1.6 centimeters; there were multiple other round hypoechoic avascular masses measuring less than 1 centimeter throughout the right breast; there was a right axillary lymph node with cortical thickness of 0.4 centimeters.  An ultrasound sound guided core biopsy of the breast masses and axillary lymph node was recommended.  The patient went on to have an ultrasound-guided core biopsy of the right breast 1 o'clock axis lesion 3 centimeters from nipple with a finding of invasive moderately differentiated ductal carcinoma measuring 13 millimeters, with evidence of DCIS, estrogen receptor positive (greater than 95 percent), progesterone receptor positive (greater than 95 percent), and HER-2/prieto equivocal (2+) with confirmatory FISH returning non amplified/negative. The right axillary lymph node core biopsy returned with lymph node negative for carcinoma.     The patient went on to have a bilateral breast MRI on 05/19/2023 with a finding of marked background enhancement limiting sensitivity of bilateral breasts; in the right breast, contiguous multiple suspicious masses and regional area of non mass enhancement predominantly involving the upper outer and upper central breast with extension to the lower central and lower breast measuring at least 5.8 x 5.2 x 8.2 centimeters with a biopsy marker in the cranial aspect of the 1 o'clock lesions corresponding to biopsy-proven cancer; additionally there was asymmetric enhancement of the right nipple concerning for disease extension into the nipple-areolar complex; there was non mass enhancement abutting the pectoralis muscle without enhancement of the muscle itself or chest wall to suggest involvement; there are other scattered enhancing nonspecific foci; in the left breast, there were scattered enhancing nonspecific foci but no suspicious enhancement of the left breast; there were abnormal right axillary level 1 and level 2 lymph nodes with round morphology and effaced fatty hilum with the largest level 1 lymph node containing a biopsy marker measuring 1.4 centimeters with additional enlarged left axillary 1 lymph nodes ranging in size from 1.4-1.6 centimeters; there was an enlarged 8 millimeter right internal mammary lymph node; there was also a fat containing left internal mammary lymph node.  The patient was seen in consultation on 6/8/23 regarding further treatment recommendations.    The patient has had an [employer] Diagnostic Cancer Genetic Predisposition Panel Test done with a finding of no pathogenic mutations, but multiple variants of unknown significance.  I had an extensive discussion with Ms. Ambriz regarding her newly diagnosed breast cancer, clinical T3 N0 Mx, clinical stage 2A breast cancer, noting the implications of a clinical and radiologic presentation on subsequent risk of developing local and metastatic recurrence.   In light of the above, I recommended proceeding with primary breast surgery at this time, and the patient noted she has already decided to have bilateral mastectomies.  I have noted that if she is surgically operable, which I believe she is at this time, that doing so may provide us more information regarding definitive pathologic prognostic factors which may then drive decision-making as to her potential risk of metastatic recurrence and recommendations for potential adjuvant therapies.  I have noted that should she have a tumor less than 5 centimeters, and lymph node negative, I would consider having her tumor sent off for Oncotype DX testing, having discussed this molecular profiling tool and its role in decision-making as to whether to add adjuvant chemotherapy to adjuvant antiestrogen therapy.  Should she have any lymph node involvement, I have noted that per the RxPonder study, all patients would be recommended to undergo adjuvant chemotherapy and consequently would not send that study off.  I have noted that should she have any of the above that would be an indication for chemotherapy, that I would deliver adjuvant chemotherapy to be determined based on her pathologic findings to potentially decrease the risk of metastatic recurrence.  This would then be followed by adjuvant antiestrogen therapy with Lupron to render her chemically menopausal followed by an aromatase inhibitor such as anastrozole.  The potential risks, benefits, anticipated side effects were preliminarily discussed.  I have noted that should she have node-negative cancer, and have a low risk score on Oncotype DX testing, primary adjuvant antiestrogen therapy alone would be considered.   I asked the patient in advance if percarmandoce she requires adjuvant chemotherapy, whether she wishes any further childbearing and she has replied no and consequently will not refer her for fertility preservation consultation at this time.   I also noted that should her PET-CT scan return with evidence of metastatic disease, above recommendations would defer greatly.  I also discussed the potential role of adjuvant radiation therapy, even in the postmastectomy setting in some patients, having outlined preliminarily the criteria for doing so.  I have deferred definitive adjuvant radiation therapy recommendations to a radiation oncologist in the future.  She went on to have a R skin sparing MRM an dL breast nipple sparing MRM with LARISA reconstruction. Path: 1- Left breast 2- Right breast 3- Right axillary sentinel lymph node 1 4- Right axillary sentinel lymph node 2 5- Right axillary sentinel lymph node 3 6- Right internal mammary lymph node 7- Left internal mammary lymph node  Final Diagnosis  1. Breast, left, mastectomy - Indtraductal papilloma - Proliferative fibrocystic change  2. Breast, right, mastectomy - Invasive moderate to poorly differentiated ductal carcinoma with focal microcalcifications (size: 35 mm), see synoptic summary - Ductal carcinoma in situ (DCIS), solid, cribriform, papillary and micropapillary types with intermediate to high nuclear grade, central necrosis and microcalcifications - Two lymph nodes, negative for carcinoma - Biopsy site change  Note: DCIS extends over a 80  mm area. Invasive carcinoma involves the inked cauterized posterior margin in 2 blocks (largest contiguous focus of involvement measures 2 mm) and inked cauterized anterior margin (focus measures < 1 mm ).  3. Lymph node, right axillary sentinel lymph node 1, excision - Four lymph nodes, negative for carcinoma  4. Lymph node, right axillary sentinel lymph node 2, excision - Three lymph nodes negative for carcinoma  5. Lymph node, right axillary sentinel lymph node 3, excision - Seven lymph nodes negative for carcinoma  6. Lymph node, right internal mammary, excision - One lymph node negative for carcinoma   7. Lymph node, left internal mammary, excision - Fibroadipose tissue with no significant histopathological change - No lymphoid tissue identified  2/2024 She is transferring care from Dr. Morales, first visit with me today.  Chart reviewed. Patient reports that she never started tamoxifen.  She is extremely concerned about endometrial toxicity and some of the other side effects related to tamoxifen.  She is under the impression that her cancer was low-grade and endocrine therapy is not necessary.  We reviewed pathology report in great detail.  We reviewed Oncotype results.  I explained the patient that chemotherapy  would have a marginal 1.6% benefit is not strongly recommended.  Endocrine therapy is strongly recommended due to strong ER/ID positivity and a relatively large tumor size.  Tamoxifen side effects were reviewed in detail.  She is very anxious about tamoxifen toxicity.  She reports very heavy menstrual periods.  We discussed treatment with ovarian suppression and anastrozole.  Patient is interested in ovarian suppression.  Side effects of Lupron and exemestane were reviewed.  She will start Lupron next week.  I will see her after 3-4 doses of Lupron to start AI.  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  We will monitor closely if ovarian suppression helps with inflammation and cysts.  Periods are regular, LMP 2/2024,  using condoms, stopped OCP after dx. Has 2 kids - 15 yrs and 5 yrs. Using condoms. Doesnt want more kids  11/20/2024 This is my second visit with her.  At first visit 2/2024 I strongly recommended to start endocrine therapy for curative intent.  At that time patient seemed to be in favor of Lupron plus AI but did not return to office for injections or appointments.  She was lost to follow-up.  Today she returned to office because she has breast pain.  She has been undergoing cosmetic procedures.  She is concerned about cancer recurrence.  She is requesting a PET scan.  She met with Dr. Casey (GYN) and wanted to get BSO.  Dr. Casey encouraged her to see me for a follow-up to discuss cancer directed therapy.  Patient reports that she has been speaking to people and has been reading reviews "to get insight about cancer treatment".  She did not start cancer treatment in February 2024 as she was concerned about side effects. Patient stated that she is "healthy" and she " had" cancer.  We reviewed pathology report in detail.  She had T2, grade 3 tumor with Oncotype DX of 18.  Chemotherapy had 1.6% benefit and was not offered.  Endocrine therapy was recommended by Dr. Morales (initial consult) and by me (second visit)  We discussed that there is a high likelihood of cancer recurrence and distant metastatic spread  leading to untimely death without adjuvant therapy.  Patient understood what was explained to her but was not quite convinced to start treatment.  I strongly encouraged her to consider second medical oncology opinion.  I also offered to call her spouse to explain that noncompliance with cancer treatment can lead to untimely death.  She did not want me to reach out to her spouse and stated  is on her side. She has very heavy menstrual bleeding.  We discussed side effects of tamoxifen as well as OS plus AI in detail.  I will favor OS plus AI given her young age of diagnosis and high risk disease.  She seems to be in favor of ovarian suppression but is also concerned about cardiac and bone toxicity.  We discussed cardio oncology and endocrinology management of side effects.   We also discussed recent approval of ribociclib for patients with high risk breast cancer.  Patient meets the criteria for adjuvant ribociclib (T2 N0 grade 3 tumor).   Despite a long discussion, patient seems to have conflicted opinion about adjuvant endocrine therapy We discussed to obtain a PET/CT and, follow-up with me in 1 to 2 weeks.   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  We will monitor closely if ovarian suppression helps with inflammation and cysts.  Periods are regular, LMP 11/2024,  using condoms, stopped OCP after dx. Has 2 kids - 15 yrs and 5 yrs. Using condoms. Doesnt want more kids   1/2025 CT CAP and bone scan 1/2025 JAMILA Here to start lupron today S/e reviewed AI to start 3/2025, ribo 4/2025 pt in agreement   We discussed cardio oncology and endocrinology management of side effects.   We also discussed recent approval of ribociclib for patients with high risk breast cancer.  Patient meets the criteria for adjuvant ribociclib (T2 N0 grade 3 tumor).  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  We will monitor closely if ovarian suppression helps with inflammation and cysts.   [de-identified] : This is a very pleasant 37-year-old premenopausal lady diagnosed with stage IA T2 N0 MX ER/ME strongly positive HER2/prieto negative poorly differentiated invasive ductal carcinoma, status post bilateral mastectomies with reconstruction 7/2023, completed radiation 11/2023 (margin issue).  BRCA panel test negative.  Oncotype DX 18.  She met with Dr. Morales 9/2023 and tamoxifen was prescribed. Patient never started tamoxifen and saw me for first visit 2/2024.  We reviewed pathology and strongly recommended endocrine therapy.  Options for tamoxifen versus OS plus AI reviewed. CT JAMILA 1/1025. Lupron started 1/2025. AI 3/2025  3/12/2025 She is here for lupron # 3  LMP 1/2025 She has mild muscle and joint issues. She is walking on treadmill x 60 min, 5 days a week mild hot flashes  discussed to start AI  I recommend Arimidex 1 mg daily for 5-10 years. I discussed the risks and benefits of aromatase inhibitor therapy including fatigue, coronary artery disease, hyperlipidemia, vaginal dryness, mood changes, hot flashes, GI disturbances, arthralgias, myalgias, and osteoporosis. I will obtain bone density scan to evaluate her bone health prior to starting anastrozole. I recommend her to continue calcium and vitamin D supplementation.  We will see her back in 2 months to start ribociclib at next visit. Will check blood work including hormone levels at next visit Will make a referral for cardio oncology and bone density in the next 6 to 12 months. Periods are regular, LMP 1/2025  using condoms, stopped OCP after dx. Has 2 kids - 15 yrs and 5 yrs. Using condoms. Doesnt want more kids  5/2025 She started lupron Jan 2025 She has had 4 injections She is still having regular periods 3/19/25 x 8 days  4/21/2025 x 6 days  estradiol 4/2025 35 She started anastrozole in 3/2025. No s/e If she continues to mensurate despite lupron, would consider BSO or switching to tamoxifen  Estradiol is lower than what is expected at her age, likely 2/2 lupron effect. Would give 2 more months  lupron today and next month f/u with me in 1 m to see if she is still menstruating  Will change to [place] next month if she gets another period this month  Will wait to add ribo until next visit

## 2025-05-20 NOTE — REVIEW OF SYSTEMS
Interval History: See hospital course for today      Review of Systems   Constitutional:  Positive for activity change (improving) and fatigue. Negative for appetite change and fever.   Respiratory:  Negative for shortness of breath.    Musculoskeletal:  Positive for gait problem and myalgias.   Skin:  Positive for color change and wound.   Neurological:  Positive for weakness.   Psychiatric/Behavioral:  Positive for dysphoric mood. Negative for agitation, behavioral problems, confusion and decreased concentration. The patient is not nervous/anxious.      Objective:     Vital Signs (Most Recent):  Temp: 98.3 °F (36.8 °C) (04/18/25 1153)  Pulse: 95 (04/18/25 1153)  Resp: 18 (04/18/25 1153)  BP: (!) 118/58 (04/18/25 1153)  SpO2: (!) 92 % (04/18/25 1153) Vital Signs (24h Range):  Temp:  [97.6 °F (36.4 °C)-100.1 °F (37.8 °C)] 98.3 °F (36.8 °C)  Pulse:  [] 95  Resp:  [17-19] 18  SpO2:  [92 %-95 %] 92 %  BP: (110-139)/(54-64) 118/58     Weight: 126.1 kg (278 lb)  Body mass index is 50.85 kg/m².    Intake/Output Summary (Last 24 hours) at 4/18/2025 1223  Last data filed at 4/18/2025 0828  Gross per 24 hour   Intake 600 ml   Output 600 ml   Net 0 ml         Physical Exam  Vitals and nursing note reviewed.   Constitutional:       General: She is not in acute distress.     Appearance: She is morbidly obese. She is ill-appearing. She is not toxic-appearing.   HENT:      Head: Normocephalic and atraumatic.   Cardiovascular:      Rate and Rhythm: Normal rate.   Pulmonary:      Effort: Pulmonary effort is normal. No respiratory distress.   Abdominal:      Palpations: Abdomen is soft.      Tenderness: There is no abdominal tenderness.   Musculoskeletal:         General: Swelling and tenderness present.      Right lower leg: Edema present.      Left lower leg: No edema.   Skin:     General: Skin is warm.      Findings: Erythema (blanchable) present.   Neurological:      Mental Status: She is alert and oriented to person,  place, and time.      Motor: Weakness present.   Psychiatric:         Mood and Affect: Mood is depressed.         Behavior: Behavior normal.               Significant Labs: All pertinent labs within the past 24 hours have been reviewed.  Blood Culture: negative growth to date   CBC:   Recent Labs   Lab 04/17/25 0839 04/18/25  0522   WBC 18.51* 14.10*   HGB 10.2* 9.9*   HCT 32.5* 31.1*    378     CMP:   Recent Labs   Lab 04/17/25 0839 04/18/25  0522   * 136   K 3.8 3.9   CL 97 101   CO2 27 27   BUN 35* 30*   CREATININE 1.7* 1.3   CALCIUM 8.5* 8.3*   ANIONGAP 8 8     Fluid studies no organisms seen    Significant Imaging: I have reviewed all pertinent imaging results/findings within the past 24 hours.  Mri pending   [Fever] : no fever [Chills] : no chills [Negative] : Respiratory [de-identified] : Bra and dressings in place

## 2025-05-20 NOTE — REASON FOR VISIT
[Post Op: _________] : a [unfilled] post op visit [FreeTextEntry1] : The patient is a 38-year-old female s/p: right breast and right nipple, fat grafting to superior pole of left breast and revision of bilateral dog ears. Patient presents today for first post op visit.

## 2025-05-20 NOTE — HISTORY OF PRESENT ILLNESS
[FreeTextEntry1] : Pt s/p fat grafting to bilateral breasts and fat grafting to right nipple and revision of bilateral abdominal dog ears.  Pt doing well no complaints

## 2025-05-20 NOTE — REVIEW OF SYSTEMS
[Fever] : no fever [Chills] : no chills [Negative] : Respiratory [de-identified] : Bra and dressings in place

## 2025-05-20 NOTE — PHYSICAL EXAM
[NI] : Normal [de-identified] : Breasts healing well fat grafting take breasts closer in size and superior pole better contour.  Nipple healing well [de-identified] : Abd healing well no sign of infection

## 2025-05-20 NOTE — PHYSICAL EXAM
[NI] : Normal [de-identified] : Breasts healing well fat grafting take breasts closer in size and superior pole better contour.  Nipple healing well [de-identified] : Abd healing well no sign of infection

## 2025-05-27 NOTE — HISTORY OF PRESENT ILLNESS
[de-identified] : Vernell Ambriz is a 38-year-old female presents to the office for evaluation of her right knee pain.  Patient has been experiencing right knee pain since March.  Pain is located in the anterior and posterior knee.  It is worse with standing from a seated position, kneeling, and squatting.  She has not tried pain medications or physical therapy.  A prior ultrasound was negative.  History: Breast cancer s/p Radiation (On Lupron, Anastrozole)

## 2025-05-27 NOTE — DISCUSSION/SUMMARY
[de-identified] : Vernell Ambriz is a 38-year-old female presents to the office for evaluation of her right knee pain.  X-rays showed no obvious fractures or dislocations.  Examination showed good right knee range of motion. Discussed with the patient the examination and imaging findings.  Discussed with the patient the management of patient's patellofemoral pain at this time, including physical therapy and anti-inflammatories.  Patient was given referral for physical therapy.  Patient will take over-the-counter anti-inflammatories as needed for pain control.  Patient will follow-up in 2 months for reevaluation and management.  Patient understanding and in agreement the plan.  All questions answered   Plan: -Physical Therapy -Over-the-counter anti-inflammatories as needed for pain control -Follow up in 2 months for reevaluation and management

## 2025-05-27 NOTE — DISCUSSION/SUMMARY
[de-identified] : Vernell Ambriz is a 38-year-old female presents to the office for evaluation of her right knee pain.  X-rays showed no obvious fractures or dislocations.  Examination showed good right knee range of motion. Discussed with the patient the examination and imaging findings.  Discussed with the patient the management of patient's patellofemoral pain at this time, including physical therapy and anti-inflammatories.  Patient was given referral for physical therapy.  Patient will take over-the-counter anti-inflammatories as needed for pain control.  Patient will follow-up in 2 months for reevaluation and management.  Patient understanding and in agreement the plan.  All questions answered   Plan: -Physical Therapy -Over-the-counter anti-inflammatories as needed for pain control -Follow up in 2 months for reevaluation and management

## 2025-05-27 NOTE — PHYSICAL EXAM
[de-identified] : Constitutional:  38 year old female, alert and oriented, cooperative, in no acute distress.  HEENT  NC/AT.  Appearance: symmetric  Neck/Back Straight without deformity or instability.  Good ROM.  Chest/Respiratory  Respiratory effort: no intercostal retractions or use of accessory muscles. Nonlabored Breathing  Skin  On inspection, warm and dry without rashes or lesions.  Mental Status:  Judgment, insight: intact Orientation: oriented to time, place, and person  Neurological: Sensory and Motor are grossly intact throughout  Right Knee  Inspection:     Skin intact, no rashes or lesions     No Effusion     Non-tender to palpation over tibial tubercle, patella, medial and lateral joint line, and pes insertion.  Range of Motion: 	Extension - 0 degrees 	Flexion - 120 degrees 	Extensor lag: None  Stability:      Demonstrates no Varus or Valgus instability      Negative Anterior or Posterior drawer.      Negative Lachman's  Patella: stable, tracks well.   Neurologic Exam     Motor intact including 5/5 Extensor Hallucis Longus, 5/5 Flexor Hallucis Longus, 5/5 Tibialis Anterior and 5/5 Gastrocnemius     Sensation Intact to Light Touch including Saphenous, Sural, Superficial Peroneal, Deep Peroneal, Tibial nerve distributions  Vascular Exam     Foot is warm and well perfused with 2+ Dorsalis Pedis Pulse   No pain with range of motion of the bilateral hips or left knee. No lumbar paraspinal muscle tenderness. details… [de-identified] : XRay:  XRays of the Right Knee (3 Views) taken in the office today and discussed with the patient. XRays demonstrate no obvious fracture or dislocation. There is no significant evidence of osteoarthritis or osteophyte formation. (my personal interpretation).

## 2025-05-27 NOTE — HISTORY OF PRESENT ILLNESS
[de-identified] : Vernell Ambriz is a 38-year-old female presents to the office for evaluation of her right knee pain.  Patient has been experiencing right knee pain since March.  Pain is located in the anterior and posterior knee.  It is worse with standing from a seated position, kneeling, and squatting.  She has not tried pain medications or physical therapy.  A prior ultrasound was negative.  History: Breast cancer s/p Radiation (On Lupron, Anastrozole)

## 2025-05-27 NOTE — PHYSICAL EXAM
[de-identified] : Constitutional:  38 year old female, alert and oriented, cooperative, in no acute distress.  HEENT  NC/AT.  Appearance: symmetric  Neck/Back Straight without deformity or instability.  Good ROM.  Chest/Respiratory  Respiratory effort: no intercostal retractions or use of accessory muscles. Nonlabored Breathing  Skin  On inspection, warm and dry without rashes or lesions.  Mental Status:  Judgment, insight: intact Orientation: oriented to time, place, and person  Neurological: Sensory and Motor are grossly intact throughout  Right Knee  Inspection:     Skin intact, no rashes or lesions     No Effusion     Non-tender to palpation over tibial tubercle, patella, medial and lateral joint line, and pes insertion.  Range of Motion: 	Extension - 0 degrees 	Flexion - 120 degrees 	Extensor lag: None  Stability:      Demonstrates no Varus or Valgus instability      Negative Anterior or Posterior drawer.      Negative Lachman's  Patella: stable, tracks well.   Neurologic Exam     Motor intact including 5/5 Extensor Hallucis Longus, 5/5 Flexor Hallucis Longus, 5/5 Tibialis Anterior and 5/5 Gastrocnemius     Sensation Intact to Light Touch including Saphenous, Sural, Superficial Peroneal, Deep Peroneal, Tibial nerve distributions  Vascular Exam     Foot is warm and well perfused with 2+ Dorsalis Pedis Pulse   No pain with range of motion of the bilateral hips or left knee. No lumbar paraspinal muscle tenderness. [de-identified] : XRay:  XRays of the Right Knee (3 Views) taken in the office today and discussed with the patient. XRays demonstrate no obvious fracture or dislocation. There is no significant evidence of osteoarthritis or osteophyte formation. (my personal interpretation).

## 2025-06-04 NOTE — PHYSICAL EXAM
[NI] : Normal [de-identified] : Breasts healing well fat grafting take breasts closer in size and superior pole better contour.  Nipple healing well [de-identified] : Abd healing well no sign of infection

## 2025-06-04 NOTE — PHYSICAL EXAM
[NI] : Normal [de-identified] : Breasts healing well fat grafting take breasts closer in size and superior pole better contour.  Nipple healing well [de-identified] : Abd healing well no sign of infection

## 2025-06-04 NOTE — REASON FOR VISIT
[Post Op: _________] : a [unfilled] post op visit [FreeTextEntry1] : The patient is a 38 year old female here today for a post op visit s/p ... (DOS: 5/16/2025).

## 2025-06-11 NOTE — CONSULT LETTER
[Dear  ___] : Dear  [unfilled], [Courtesy Letter:] : I had the pleasure of seeing your patient, [unfilled], in my office today. [Please see my note below.] : Please see my note below. [Consult Closing:] : Thank you very much for allowing me to participate in the care of this patient.  If you have any questions, please do not hesitate to contact me. [Sincerely,] : Sincerely, [FreeTextEntry3] : Sita Sol MD Attending Physician, Division of Medical Oncology and Hematology Medical Director, [facility] for [facility], Pregnancy and Reproduction Medical Director, Breast Wellness and Cancer Prevention Program  [facility] [place] , [employer] [facility]

## 2025-06-11 NOTE — HISTORY OF PRESENT ILLNESS
[de-identified] : The patient's history of present illness began when she first palpated a right upper breast lump sometime in February 2023.  She reports having a primary care routine visit scheduled for March 2023 and at that time she was referred for a diagnostic mammogram and sonogram.  These were performed on 04/20/2023 with the mammogram finding bilateral heterogeneously dense breasts; no suspicious findings were noted in the left breast; there was global asymmetry involving the right breast; in the area of the palpable concern, there was an isodense mass that was microlobulated and containing calcifications; additionally in close proximity, there was another area of architectural distortion with calcifications; there were microcalcifications at the anterior right retroareolar region that appeared to be a group of amorphous microcalcifications of note.  A bilateral breast ultrasound performed on that same date noted in the right breast in the area of palpable concern at the 1 o'clock axis, 3 centimeters from nipple, there was an irregular hypoechoic mass with angular margins measuring 2 x 1 x 2.1 centimeters, indeterminate; additionally at the 1 o'clock axis 2 centimeters from nipple, there was an additional hypoechoic irregular mass that measured 1.7 x 0.9 x 1.6 centimeters; there were multiple other round hypoechoic avascular masses measuring less than 1 centimeter throughout the right breast; there was a right axillary lymph node with cortical thickness of 0.4 centimeters.  An ultrasound sound guided core biopsy of the breast masses and axillary lymph node was recommended.  The patient went on to have an ultrasound-guided core biopsy of the right breast 1 o'clock axis lesion 3 centimeters from nipple with a finding of invasive moderately differentiated ductal carcinoma measuring 13 millimeters, with evidence of DCIS, estrogen receptor positive (greater than 95 percent), progesterone receptor positive (greater than 95 percent), and HER-2/prieto equivocal (2+) with confirmatory FISH returning non amplified/negative. The right axillary lymph node core biopsy returned with lymph node negative for carcinoma.     The patient went on to have a bilateral breast MRI on 05/19/2023 with a finding of marked background enhancement limiting sensitivity of bilateral breasts; in the right breast, contiguous multiple suspicious masses and regional area of non mass enhancement predominantly involving the upper outer and upper central breast with extension to the lower central and lower breast measuring at least 5.8 x 5.2 x 8.2 centimeters with a biopsy marker in the cranial aspect of the 1 o'clock lesions corresponding to biopsy-proven cancer; additionally there was asymmetric enhancement of the right nipple concerning for disease extension into the nipple-areolar complex; there was non mass enhancement abutting the pectoralis muscle without enhancement of the muscle itself or chest wall to suggest involvement; there are other scattered enhancing nonspecific foci; in the left breast, there were scattered enhancing nonspecific foci but no suspicious enhancement of the left breast; there were abnormal right axillary level 1 and level 2 lymph nodes with round morphology and effaced fatty hilum with the largest level 1 lymph node containing a biopsy marker measuring 1.4 centimeters with additional enlarged left axillary 1 lymph nodes ranging in size from 1.4-1.6 centimeters; there was an enlarged 8 millimeter right internal mammary lymph node; there was also a fat containing left internal mammary lymph node.  The patient was seen in consultation on 6/8/23 regarding further treatment recommendations.    The patient has had an [id number] Diagnostic Cancer Genetic Predisposition Panel Test done with a finding of no pathogenic mutations, but multiple variants of unknown significance.  I had an extensive discussion with Ms. Ambriz regarding her newly diagnosed breast cancer, clinical T3 N0 Mx, clinical stage 2A breast cancer, noting the implications of a clinical and radiologic presentation on subsequent risk of developing local and metastatic recurrence.   In light of the above, I recommended proceeding with primary breast surgery at this time, and the patient noted she has already decided to have bilateral mastectomies.  I have noted that if she is surgically operable, which I believe she is at this time, that doing so may provide us more information regarding definitive pathologic prognostic factors which may then drive decision-making as to her potential risk of metastatic recurrence and recommendations for potential adjuvant therapies.  I have noted that should she have a tumor less than 5 centimeters, and lymph node negative, I would consider having her tumor sent off for Oncotype DX testing, having discussed this molecular profiling tool and its role in decision-making as to whether to add adjuvant chemotherapy to adjuvant antiestrogen therapy.  Should she have any lymph node involvement, I have noted that per the RxPonder study, all patients would be recommended to undergo adjuvant chemotherapy and consequently would not send that study off.  I have noted that should she have any of the above that would be an indication for chemotherapy, that I would deliver adjuvant chemotherapy to be determined based on her pathologic findings to potentially decrease the risk of metastatic recurrence.  This would then be followed by adjuvant antiestrogen therapy with Lupron to render her chemically menopausal followed by an aromatase inhibitor such as anastrozole.  The potential risks, benefits, anticipated side effects were preliminarily discussed.  I have noted that should she have node-negative cancer, and have a low risk score on Oncotype DX testing, primary adjuvant antiestrogen therapy alone would be considered.   I asked the patient in advance if percarmandoce she requires adjuvant chemotherapy, whether she wishes any further childbearing and she has replied no and consequently will not refer her for fertility preservation consultation at this time.   I also noted that should her PET-CT scan return with evidence of metastatic disease, above recommendations would defer greatly.  I also discussed the potential role of adjuvant radiation therapy, even in the postmastectomy setting in some patients, having outlined preliminarily the criteria for doing so.  I have deferred definitive adjuvant radiation therapy recommendations to a radiation oncologist in the future.  She went on to have a R skin sparing MRM an dL breast nipple sparing MRM with LARISA reconstruction. Path: 1- Left breast 2- Right breast 3- Right axillary sentinel lymph node 1 4- Right axillary sentinel lymph node 2 5- Right axillary sentinel lymph node 3 6- Right internal mammary lymph node 7- Left internal mammary lymph node  Final Diagnosis  1. Breast, left, mastectomy - Indtraductal papilloma - Proliferative fibrocystic change  2. Breast, right, mastectomy - Invasive moderate to poorly differentiated ductal carcinoma with focal microcalcifications (size: 35 mm), see synoptic summary - Ductal carcinoma in situ (DCIS), solid, cribriform, papillary and micropapillary types with intermediate to high nuclear grade, central necrosis and microcalcifications - Two lymph nodes, negative for carcinoma - Biopsy site change  Note: DCIS extends over a 80  mm area. Invasive carcinoma involves the inked cauterized posterior margin in 2 blocks (largest contiguous focus of involvement measures 2 mm) and inked cauterized anterior margin (focus measures < 1 mm ).  3. Lymph node, right axillary sentinel lymph node 1, excision - Four lymph nodes, negative for carcinoma  4. Lymph node, right axillary sentinel lymph node 2, excision - Three lymph nodes negative for carcinoma  5. Lymph node, right axillary sentinel lymph node 3, excision - Seven lymph nodes negative for carcinoma  6. Lymph node, right internal mammary, excision - One lymph node negative for carcinoma   7. Lymph node, left internal mammary, excision - Fibroadipose tissue with no significant histopathological change - No lymphoid tissue identified  2/2024 She is transferring care from Dr. Morales, first visit with me today.  Chart reviewed. Patient reports that she never started tamoxifen.  She is extremely concerned about endometrial toxicity and some of the other side effects related to tamoxifen.  She is under the impression that her cancer was low-grade and endocrine therapy is not necessary.  We reviewed pathology report in great detail.  We reviewed Oncotype results.  I explained the patient that chemotherapy  would have a marginal 1.6% benefit is not strongly recommended.  Endocrine therapy is strongly recommended due to strong ER/MT positivity and a relatively large tumor size.  Tamoxifen side effects were reviewed in detail.  She is very anxious about tamoxifen toxicity.  She reports very heavy menstrual periods.  We discussed treatment with ovarian suppression and anastrozole.  Patient is interested in ovarian suppression.  Side effects of Lupron and exemestane were reviewed.  She will start Lupron next week.  I will see her after 3-4 doses of Lupron to start AI.  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  We will monitor closely if ovarian suppression helps with inflammation and cysts.  Periods are regular, LMP 2/2024,  using condoms, stopped OCP after dx. Has 2 kids - 15 yrs and 5 yrs. Using condoms. Doesnt want more kids  11/20/2024 This is my second visit with her.  At first visit 2/2024 I strongly recommended to start endocrine therapy for curative intent.  At that time patient seemed to be in favor of Lupron plus AI but did not return to office for injections or appointments.  She was lost to follow-up.  Today she returned to office because she has breast pain.  She has been undergoing cosmetic procedures.  She is concerned about cancer recurrence.  She is requesting a PET scan.  She met with Dr. Casey (GYN) and wanted to get BSO.  Dr. Casey encouraged her to see me for a follow-up to discuss cancer directed therapy.  Patient reports that she has been speaking to people and has been reading reviews "to get insight about cancer treatment".  She did not start cancer treatment in February 2024 as she was concerned about side effects. Patient stated that she is "healthy" and she " had" cancer.  We reviewed pathology report in detail.  She had T2, grade 3 tumor with Oncotype DX of 18.  Chemotherapy had 1.6% benefit and was not offered.  Endocrine therapy was recommended by Dr. Morales (initial consult) and by me (second visit)  We discussed that there is a high likelihood of cancer recurrence and distant metastatic spread  leading to untimely death without adjuvant therapy.  Patient understood what was explained to her but was not quite convinced to start treatment.  I strongly encouraged her to consider second medical oncology opinion.  I also offered to call her spouse to explain that noncompliance with cancer treatment can lead to untimely death.  She did not want me to reach out to her spouse and stated  is on her side. She has very heavy menstrual bleeding.  We discussed side effects of tamoxifen as well as OS plus AI in detail.  I will favor OS plus AI given her young age of diagnosis and high risk disease.  She seems to be in favor of ovarian suppression but is also concerned about cardiac and bone toxicity.  We discussed cardio oncology and endocrinology management of side effects.   We also discussed recent approval of ribociclib for patients with high risk breast cancer.  Patient meets the criteria for adjuvant ribociclib (T2 N0 grade 3 tumor).   Despite a long discussion, patient seems to have conflicted opinion about adjuvant endocrine therapy We discussed to obtain a PET/CT and, follow-up with me in 1 to 2 weeks.   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  We will monitor closely if ovarian suppression helps with inflammation and cysts.  Periods are regular, LMP 11/2024,  using condoms, stopped OCP after dx. Has 2 kids - 15 yrs and 5 yrs. Using condoms. Doesnt want more kids   1/2025 CT CAP and bone scan 1/2025 JAMILA Here to start lupron today S/e reviewed AI to start 3/2025, ribo 4/2025 pt in agreement   We discussed cardio oncology and endocrinology management of side effects.   We also discussed recent approval of ribociclib for patients with high risk breast cancer.  Patient meets the criteria for adjuvant ribociclib (T2 N0 grade 3 tumor).  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  We will monitor closely if ovarian suppression helps with inflammation and cysts.    3/12/2025 She is here for lupron # 3  LMP 1/2025 She has mild muscle and joint issues. She is walking on treadmill x 60 min, 5 days a week mild hot flashes  discussed to start AI  I recommend Arimidex 1 mg daily for 5-10 years. I discussed the risks and benefits of aromatase inhibitor therapy including fatigue, coronary artery disease, hyperlipidemia, vaginal dryness, mood changes, hot flashes, GI disturbances, arthralgias, myalgias, and osteoporosis. I will obtain bone density scan to evaluate her bone health prior to starting anastrozole. I recommend her to continue calcium and vitamin D supplementation.  We will see her back in 2 months to start ribociclib at next visit. Will check blood work including hormone levels at next visit Will make a referral for cardio oncology and bone density in the next 6 to 12 months. Periods are regular, LMP 1/2025  using condoms, stopped OCP after dx. Has 2 kids - 15 yrs and 5 yrs. Using condoms. Doesnt want more kids  5/2025 She started lupron Jan 2025 She has had 4 injections She is still having regular periods 3/19/25 x 8 days  4/21/2025 x 6 days  estradiol 4/2025 35 She started anastrozole in 3/2025. No s/e If she continues to mensurate despite lupron, would consider BSO or switching to tamoxifen  Estradiol is lower than what is expected at her age, likely 2/2 lupron effect. Would give 2 more months  lupron today and next month f/u with me in 1 m to see if she is still menstruating  Will change to [place] next month if she gets another period this month  Will wait to add ribo until next visit  [de-identified] : This is a very pleasant 38-year-old premenopausal lady diagnosed with stage IA T2 N0 MX ER/OK strongly positive HER2/prieto negative poorly differentiated invasive ductal carcinoma, status post bilateral mastectomies with reconstruction 7/2023, completed radiation 11/2023 (margin issue).  BRCA panel test negative.  Oncotype DX 18.  She met with Dr. Morales 9/2023 and Tamoxifen was prescribed. Patient never started tamoxifen and saw me for first visit 2/2024.  We reviewed pathology and strongly recommended endocrine therapy.  Options for tamoxifen versus OS plus AI reviewed. CT JAMILA 1/1025. Lupron started 1/2025. AI 3/2025.    6/11/2025 Patient presents today to evaluate Lupron and her menstrual cycle. Lupron started January 2025.  She came for 6th injection yesterday.  She reports having regular menstrual bleeding despite being on Lupron.  Last menstrual period was May 27 to May 31.  She is expecting next menstrual period  On July 8.  We discussed that if she continues to have bleeding despite 6 Lupron injections, this would be considered Lupron failure.  We discussed option for switching back to tamoxifen or consider BSO.  She is not planning to have more children.  She has a teenage daughter who is graduating from high school and is going to nursing school.  She is interested in BSO.  Patient to follow-up with me July 9 to discuss next steps depending on expected  Menstrual period on July 8th.  Blood  work was performed April 2025 where estrogen level was elevated at 35.  We will repeat blood work in July. She is taking anastrozole for now without any side effects. We will continue to discuss ribociclib as well as she meets the criteria.  At this time given overwhelming issues related to menstrual bleeding, we will hold off

## 2025-06-11 NOTE — HISTORY OF PRESENT ILLNESS
[de-identified] : The patient's history of present illness began when she first palpated a right upper breast lump sometime in February 2023.  She reports having a primary care routine visit scheduled for March 2023 and at that time she was referred for a diagnostic mammogram and sonogram.  These were performed on 04/20/2023 with the mammogram finding bilateral heterogeneously dense breasts; no suspicious findings were noted in the left breast; there was global asymmetry involving the right breast; in the area of the palpable concern, there was an isodense mass that was microlobulated and containing calcifications; additionally in close proximity, there was another area of architectural distortion with calcifications; there were microcalcifications at the anterior right retroareolar region that appeared to be a group of amorphous microcalcifications of note.  A bilateral breast ultrasound performed on that same date noted in the right breast in the area of palpable concern at the 1 o'clock axis, 3 centimeters from nipple, there was an irregular hypoechoic mass with angular margins measuring 2 x 1 x 2.1 centimeters, indeterminate; additionally at the 1 o'clock axis 2 centimeters from nipple, there was an additional hypoechoic irregular mass that measured 1.7 x 0.9 x 1.6 centimeters; there were multiple other round hypoechoic avascular masses measuring less than 1 centimeter throughout the right breast; there was a right axillary lymph node with cortical thickness of 0.4 centimeters.  An ultrasound sound guided core biopsy of the breast masses and axillary lymph node was recommended.  The patient went on to have an ultrasound-guided core biopsy of the right breast 1 o'clock axis lesion 3 centimeters from nipple with a finding of invasive moderately differentiated ductal carcinoma measuring 13 millimeters, with evidence of DCIS, estrogen receptor positive (greater than 95 percent), progesterone receptor positive (greater than 95 percent), and HER-2/prieto equivocal (2+) with confirmatory FISH returning non amplified/negative. The right axillary lymph node core biopsy returned with lymph node negative for carcinoma.     The patient went on to have a bilateral breast MRI on 05/19/2023 with a finding of marked background enhancement limiting sensitivity of bilateral breasts; in the right breast, contiguous multiple suspicious masses and regional area of non mass enhancement predominantly involving the upper outer and upper central breast with extension to the lower central and lower breast measuring at least 5.8 x 5.2 x 8.2 centimeters with a biopsy marker in the cranial aspect of the 1 o'clock lesions corresponding to biopsy-proven cancer; additionally there was asymmetric enhancement of the right nipple concerning for disease extension into the nipple-areolar complex; there was non mass enhancement abutting the pectoralis muscle without enhancement of the muscle itself or chest wall to suggest involvement; there are other scattered enhancing nonspecific foci; in the left breast, there were scattered enhancing nonspecific foci but no suspicious enhancement of the left breast; there were abnormal right axillary level 1 and level 2 lymph nodes with round morphology and effaced fatty hilum with the largest level 1 lymph node containing a biopsy marker measuring 1.4 centimeters with additional enlarged left axillary 1 lymph nodes ranging in size from 1.4-1.6 centimeters; there was an enlarged 8 millimeter right internal mammary lymph node; there was also a fat containing left internal mammary lymph node.  The patient was seen in consultation on 6/8/23 regarding further treatment recommendations.    The patient has had an [facility] Diagnostic Cancer Genetic Predisposition Panel Test done with a finding of no pathogenic mutations, but multiple variants of unknown significance.  I had an extensive discussion with Ms. Ambriz regarding her newly diagnosed breast cancer, clinical T3 N0 Mx, clinical stage 2A breast cancer, noting the implications of a clinical and radiologic presentation on subsequent risk of developing local and metastatic recurrence.   In light of the above, I recommended proceeding with primary breast surgery at this time, and the patient noted she has already decided to have bilateral mastectomies.  I have noted that if she is surgically operable, which I believe she is at this time, that doing so may provide us more information regarding definitive pathologic prognostic factors which may then drive decision-making as to her potential risk of metastatic recurrence and recommendations for potential adjuvant therapies.  I have noted that should she have a tumor less than 5 centimeters, and lymph node negative, I would consider having her tumor sent off for Oncotype DX testing, having discussed this molecular profiling tool and its role in decision-making as to whether to add adjuvant chemotherapy to adjuvant antiestrogen therapy.  Should she have any lymph node involvement, I have noted that per the RxPonder study, all patients would be recommended to undergo adjuvant chemotherapy and consequently would not send that study off.  I have noted that should she have any of the above that would be an indication for chemotherapy, that I would deliver adjuvant chemotherapy to be determined based on her pathologic findings to potentially decrease the risk of metastatic recurrence.  This would then be followed by adjuvant antiestrogen therapy with Lupron to render her chemically menopausal followed by an aromatase inhibitor such as anastrozole.  The potential risks, benefits, anticipated side effects were preliminarily discussed.  I have noted that should she have node-negative cancer, and have a low risk score on Oncotype DX testing, primary adjuvant antiestrogen therapy alone would be considered.   I asked the patient in advance if percarmandoce she requires adjuvant chemotherapy, whether she wishes any further childbearing and she has replied no and consequently will not refer her for fertility preservation consultation at this time.   I also noted that should her PET-CT scan return with evidence of metastatic disease, above recommendations would defer greatly.  I also discussed the potential role of adjuvant radiation therapy, even in the postmastectomy setting in some patients, having outlined preliminarily the criteria for doing so.  I have deferred definitive adjuvant radiation therapy recommendations to a radiation oncologist in the future.  She went on to have a R skin sparing MRM an dL breast nipple sparing MRM with LARISA reconstruction. Path: 1- Left breast 2- Right breast 3- Right axillary sentinel lymph node 1 4- Right axillary sentinel lymph node 2 5- Right axillary sentinel lymph node 3 6- Right internal mammary lymph node 7- Left internal mammary lymph node  Final Diagnosis  1. Breast, left, mastectomy - Indtraductal papilloma - Proliferative fibrocystic change  2. Breast, right, mastectomy - Invasive moderate to poorly differentiated ductal carcinoma with focal microcalcifications (size: 35 mm), see synoptic summary - Ductal carcinoma in situ (DCIS), solid, cribriform, papillary and micropapillary types with intermediate to high nuclear grade, central necrosis and microcalcifications - Two lymph nodes, negative for carcinoma - Biopsy site change  Note: DCIS extends over a 80  mm area. Invasive carcinoma involves the inked cauterized posterior margin in 2 blocks (largest contiguous focus of involvement measures 2 mm) and inked cauterized anterior margin (focus measures < 1 mm ).  3. Lymph node, right axillary sentinel lymph node 1, excision - Four lymph nodes, negative for carcinoma  4. Lymph node, right axillary sentinel lymph node 2, excision - Three lymph nodes negative for carcinoma  5. Lymph node, right axillary sentinel lymph node 3, excision - Seven lymph nodes negative for carcinoma  6. Lymph node, right internal mammary, excision - One lymph node negative for carcinoma   7. Lymph node, left internal mammary, excision - Fibroadipose tissue with no significant histopathological change - No lymphoid tissue identified  2/2024 She is transferring care from Dr. Morales, first visit with me today.  Chart reviewed. Patient reports that she never started tamoxifen.  She is extremely concerned about endometrial toxicity and some of the other side effects related to tamoxifen.  She is under the impression that her cancer was low-grade and endocrine therapy is not necessary.  We reviewed pathology report in great detail.  We reviewed Oncotype results.  I explained the patient that chemotherapy  would have a marginal 1.6% benefit is not strongly recommended.  Endocrine therapy is strongly recommended due to strong ER/WA positivity and a relatively large tumor size.  Tamoxifen side effects were reviewed in detail.  She is very anxious about tamoxifen toxicity.  She reports very heavy menstrual periods.  We discussed treatment with ovarian suppression and anastrozole.  Patient is interested in ovarian suppression.  Side effects of Lupron and exemestane were reviewed.  She will start Lupron next week.  I will see her after 3-4 doses of Lupron to start AI.  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  We will monitor closely if ovarian suppression helps with inflammation and cysts.  Periods are regular, LMP 2/2024,  using condoms, stopped OCP after dx. Has 2 kids - 15 yrs and 5 yrs. Using condoms. Doesnt want more kids  11/20/2024 This is my second visit with her.  At first visit 2/2024 I strongly recommended to start endocrine therapy for curative intent.  At that time patient seemed to be in favor of Lupron plus AI but did not return to office for injections or appointments.  She was lost to follow-up.  Today she returned to office because she has breast pain.  She has been undergoing cosmetic procedures.  She is concerned about cancer recurrence.  She is requesting a PET scan.  She met with Dr. Casey (GYN) and wanted to get BSO.  Dr. Casey encouraged her to see me for a follow-up to discuss cancer directed therapy.  Patient reports that she has been speaking to people and has been reading reviews "to get insight about cancer treatment".  She did not start cancer treatment in February 2024 as she was concerned about side effects. Patient stated that she is "healthy" and she " had" cancer.  We reviewed pathology report in detail.  She had T2, grade 3 tumor with Oncotype DX of 18.  Chemotherapy had 1.6% benefit and was not offered.  Endocrine therapy was recommended by Dr. Morales (initial consult) and by me (second visit)  We discussed that there is a high likelihood of cancer recurrence and distant metastatic spread  leading to untimely death without adjuvant therapy.  Patient understood what was explained to her but was not quite convinced to start treatment.  I strongly encouraged her to consider second medical oncology opinion.  I also offered to call her spouse to explain that noncompliance with cancer treatment can lead to untimely death.  She did not want me to reach out to her spouse and stated  is on her side. She has very heavy menstrual bleeding.  We discussed side effects of tamoxifen as well as OS plus AI in detail.  I will favor OS plus AI given her young age of diagnosis and high risk disease.  She seems to be in favor of ovarian suppression but is also concerned about cardiac and bone toxicity.  We discussed cardio oncology and endocrinology management of side effects.   We also discussed recent approval of ribociclib for patients with high risk breast cancer.  Patient meets the criteria for adjuvant ribociclib (T2 N0 grade 3 tumor).   Despite a long discussion, patient seems to have conflicted opinion about adjuvant endocrine therapy We discussed to obtain a PET/CT and, follow-up with me in 1 to 2 weeks.   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  We will monitor closely if ovarian suppression helps with inflammation and cysts.  Periods are regular, LMP 11/2024,  using condoms, stopped OCP after dx. Has 2 kids - 15 yrs and 5 yrs. Using condoms. Doesnt want more kids   1/2025 CT CAP and bone scan 1/2025 JAMILA Here to start lupron today S/e reviewed AI to start 3/2025, ribo 4/2025 pt in agreement   We discussed cardio oncology and endocrinology management of side effects.   We also discussed recent approval of ribociclib for patients with high risk breast cancer.  Patient meets the criteria for adjuvant ribociclib (T2 N0 grade 3 tumor).  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  We will monitor closely if ovarian suppression helps with inflammation and cysts.    3/12/2025 She is here for lupron # 3  LMP 1/2025 She has mild muscle and joint issues. She is walking on treadmill x 60 min, 5 days a week mild hot flashes  discussed to start AI  I recommend Arimidex 1 mg daily for 5-10 years. I discussed the risks and benefits of aromatase inhibitor therapy including fatigue, coronary artery disease, hyperlipidemia, vaginal dryness, mood changes, hot flashes, GI disturbances, arthralgias, myalgias, and osteoporosis. I will obtain bone density scan to evaluate her bone health prior to starting anastrozole. I recommend her to continue calcium and vitamin D supplementation.  We will see her back in 2 months to start ribociclib at next visit. Will check blood work including hormone levels at next visit Will make a referral for cardio oncology and bone density in the next 6 to 12 months. Periods are regular, LMP 1/2025  using condoms, stopped OCP after dx. Has 2 kids - 15 yrs and 5 yrs. Using condoms. Doesnt want more kids  5/2025 She started lupron Jan 2025 She has had 4 injections She is still having regular periods 3/19/25 x 8 days  4/21/2025 x 6 days  estradiol 4/2025 35 She started anastrozole in 3/2025. No s/e If she continues to mensurate despite lupron, would consider BSO or switching to tamoxifen  Estradiol is lower than what is expected at her age, likely 2/2 lupron effect. Would give 2 more months  lupron today and next month f/u with me in 1 m to see if she is still menstruating  Will change to [place] next month if she gets another period this month  Will wait to add ribo until next visit  [de-identified] : This is a very pleasant 38-year-old premenopausal lady diagnosed with stage IA T2 N0 MX ER/OH strongly positive HER2/prieto negative poorly differentiated invasive ductal carcinoma, status post bilateral mastectomies with reconstruction 7/2023, completed radiation 11/2023 (margin issue).  BRCA panel test negative.  Oncotype DX 18.  She met with Dr. Morales 9/2023 and Tamoxifen was prescribed. Patient never started tamoxifen and saw me for first visit 2/2024.  We reviewed pathology and strongly recommended endocrine therapy.  Options for tamoxifen versus OS plus AI reviewed. CT JAMILA 1/1025. Lupron started 1/2025. AI 3/2025.    6/11/2025 Patient presents today to evaluate Lupron and her menstrual cycle. Lupron started January 2025.  She came for 6th injection yesterday.  She reports having regular menstrual bleeding despite being on Lupron.  Last menstrual period was May 27 to May 31.  She is expecting next menstrual period  On July 8.  We discussed that if she continues to have bleeding despite 6 Lupron injections, this would be considered Lupron failure.  We discussed option for switching back to tamoxifen or consider BSO.  She is not planning to have more children.  She has a teenage daughter who is graduating from high school and is going to nursing school.  She is interested in BSO.  Patient to follow-up with me July 9 to discuss next steps depending on expected  Menstrual period on July 8th.  Blood  work was performed April 2025 where estrogen level was elevated at 35.  We will repeat blood work in July. She is taking anastrozole for now without any side effects. We will continue to discuss ribociclib as well as she meets the criteria.  At this time given overwhelming issues related to menstrual bleeding, we will hold off

## 2025-06-11 NOTE — HISTORY OF PRESENT ILLNESS
[de-identified] : The patient's history of present illness began when she first palpated a right upper breast lump sometime in February 2023.  She reports having a primary care routine visit scheduled for March 2023 and at that time she was referred for a diagnostic mammogram and sonogram.  These were performed on 04/20/2023 with the mammogram finding bilateral heterogeneously dense breasts; no suspicious findings were noted in the left breast; there was global asymmetry involving the right breast; in the area of the palpable concern, there was an isodense mass that was microlobulated and containing calcifications; additionally in close proximity, there was another area of architectural distortion with calcifications; there were microcalcifications at the anterior right retroareolar region that appeared to be a group of amorphous microcalcifications of note.  A bilateral breast ultrasound performed on that same date noted in the right breast in the area of palpable concern at the 1 o'clock axis, 3 centimeters from nipple, there was an irregular hypoechoic mass with angular margins measuring 2 x 1 x 2.1 centimeters, indeterminate; additionally at the 1 o'clock axis 2 centimeters from nipple, there was an additional hypoechoic irregular mass that measured 1.7 x 0.9 x 1.6 centimeters; there were multiple other round hypoechoic avascular masses measuring less than 1 centimeter throughout the right breast; there was a right axillary lymph node with cortical thickness of 0.4 centimeters.  An ultrasound sound guided core biopsy of the breast masses and axillary lymph node was recommended.  The patient went on to have an ultrasound-guided core biopsy of the right breast 1 o'clock axis lesion 3 centimeters from nipple with a finding of invasive moderately differentiated ductal carcinoma measuring 13 millimeters, with evidence of DCIS, estrogen receptor positive (greater than 95 percent), progesterone receptor positive (greater than 95 percent), and HER-2/prieto equivocal (2+) with confirmatory FISH returning non amplified/negative. The right axillary lymph node core biopsy returned with lymph node negative for carcinoma.     The patient went on to have a bilateral breast MRI on 05/19/2023 with a finding of marked background enhancement limiting sensitivity of bilateral breasts; in the right breast, contiguous multiple suspicious masses and regional area of non mass enhancement predominantly involving the upper outer and upper central breast with extension to the lower central and lower breast measuring at least 5.8 x 5.2 x 8.2 centimeters with a biopsy marker in the cranial aspect of the 1 o'clock lesions corresponding to biopsy-proven cancer; additionally there was asymmetric enhancement of the right nipple concerning for disease extension into the nipple-areolar complex; there was non mass enhancement abutting the pectoralis muscle without enhancement of the muscle itself or chest wall to suggest involvement; there are other scattered enhancing nonspecific foci; in the left breast, there were scattered enhancing nonspecific foci but no suspicious enhancement of the left breast; there were abnormal right axillary level 1 and level 2 lymph nodes with round morphology and effaced fatty hilum with the largest level 1 lymph node containing a biopsy marker measuring 1.4 centimeters with additional enlarged left axillary 1 lymph nodes ranging in size from 1.4-1.6 centimeters; there was an enlarged 8 millimeter right internal mammary lymph node; there was also a fat containing left internal mammary lymph node.  The patient was seen in consultation on 6/8/23 regarding further treatment recommendations.    The patient has had an [employer] Diagnostic Cancer Genetic Predisposition Panel Test done with a finding of no pathogenic mutations, but multiple variants of unknown significance.  I had an extensive discussion with Ms. Ambriz regarding her newly diagnosed breast cancer, clinical T3 N0 Mx, clinical stage 2A breast cancer, noting the implications of a clinical and radiologic presentation on subsequent risk of developing local and metastatic recurrence.   In light of the above, I recommended proceeding with primary breast surgery at this time, and the patient noted she has already decided to have bilateral mastectomies.  I have noted that if she is surgically operable, which I believe she is at this time, that doing so may provide us more information regarding definitive pathologic prognostic factors which may then drive decision-making as to her potential risk of metastatic recurrence and recommendations for potential adjuvant therapies.  I have noted that should she have a tumor less than 5 centimeters, and lymph node negative, I would consider having her tumor sent off for Oncotype DX testing, having discussed this molecular profiling tool and its role in decision-making as to whether to add adjuvant chemotherapy to adjuvant antiestrogen therapy.  Should she have any lymph node involvement, I have noted that per the RxPonder study, all patients would be recommended to undergo adjuvant chemotherapy and consequently would not send that study off.  I have noted that should she have any of the above that would be an indication for chemotherapy, that I would deliver adjuvant chemotherapy to be determined based on her pathologic findings to potentially decrease the risk of metastatic recurrence.  This would then be followed by adjuvant antiestrogen therapy with Lupron to render her chemically menopausal followed by an aromatase inhibitor such as anastrozole.  The potential risks, benefits, anticipated side effects were preliminarily discussed.  I have noted that should she have node-negative cancer, and have a low risk score on Oncotype DX testing, primary adjuvant antiestrogen therapy alone would be considered.   I asked the patient in advance if percarmandoce she requires adjuvant chemotherapy, whether she wishes any further childbearing and she has replied no and consequently will not refer her for fertility preservation consultation at this time.   I also noted that should her PET-CT scan return with evidence of metastatic disease, above recommendations would defer greatly.  I also discussed the potential role of adjuvant radiation therapy, even in the postmastectomy setting in some patients, having outlined preliminarily the criteria for doing so.  I have deferred definitive adjuvant radiation therapy recommendations to a radiation oncologist in the future.  She went on to have a R skin sparing MRM an dL breast nipple sparing MRM with LARISA reconstruction. Path: 1- Left breast 2- Right breast 3- Right axillary sentinel lymph node 1 4- Right axillary sentinel lymph node 2 5- Right axillary sentinel lymph node 3 6- Right internal mammary lymph node 7- Left internal mammary lymph node  Final Diagnosis  1. Breast, left, mastectomy - Indtraductal papilloma - Proliferative fibrocystic change  2. Breast, right, mastectomy - Invasive moderate to poorly differentiated ductal carcinoma with focal microcalcifications (size: 35 mm), see synoptic summary - Ductal carcinoma in situ (DCIS), solid, cribriform, papillary and micropapillary types with intermediate to high nuclear grade, central necrosis and microcalcifications - Two lymph nodes, negative for carcinoma - Biopsy site change  Note: DCIS extends over a 80  mm area. Invasive carcinoma involves the inked cauterized posterior margin in 2 blocks (largest contiguous focus of involvement measures 2 mm) and inked cauterized anterior margin (focus measures < 1 mm ).  3. Lymph node, right axillary sentinel lymph node 1, excision - Four lymph nodes, negative for carcinoma  4. Lymph node, right axillary sentinel lymph node 2, excision - Three lymph nodes negative for carcinoma  5. Lymph node, right axillary sentinel lymph node 3, excision - Seven lymph nodes negative for carcinoma  6. Lymph node, right internal mammary, excision - One lymph node negative for carcinoma   7. Lymph node, left internal mammary, excision - Fibroadipose tissue with no significant histopathological change - No lymphoid tissue identified  2/2024 She is transferring care from Dr. Morales, first visit with me today.  Chart reviewed. Patient reports that she never started tamoxifen.  She is extremely concerned about endometrial toxicity and some of the other side effects related to tamoxifen.  She is under the impression that her cancer was low-grade and endocrine therapy is not necessary.  We reviewed pathology report in great detail.  We reviewed Oncotype results.  I explained the patient that chemotherapy  would have a marginal 1.6% benefit is not strongly recommended.  Endocrine therapy is strongly recommended due to strong ER/HI positivity and a relatively large tumor size.  Tamoxifen side effects were reviewed in detail.  She is very anxious about tamoxifen toxicity.  She reports very heavy menstrual periods.  We discussed treatment with ovarian suppression and anastrozole.  Patient is interested in ovarian suppression.  Side effects of Lupron and exemestane were reviewed.  She will start Lupron next week.  I will see her after 3-4 doses of Lupron to start AI.  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  We will monitor closely if ovarian suppression helps with inflammation and cysts.  Periods are regular, LMP 2/2024,  using condoms, stopped OCP after dx. Has 2 kids - 15 yrs and 5 yrs. Using condoms. Doesnt want more kids  11/20/2024 This is my second visit with her.  At first visit 2/2024 I strongly recommended to start endocrine therapy for curative intent.  At that time patient seemed to be in favor of Lupron plus AI but did not return to office for injections or appointments.  She was lost to follow-up.  Today she returned to office because she has breast pain.  She has been undergoing cosmetic procedures.  She is concerned about cancer recurrence.  She is requesting a PET scan.  She met with Dr. Casey (GYN) and wanted to get BSO.  Dr. Casey encouraged her to see me for a follow-up to discuss cancer directed therapy.  Patient reports that she has been speaking to people and has been reading reviews "to get insight about cancer treatment".  She did not start cancer treatment in February 2024 as she was concerned about side effects. Patient stated that she is "healthy" and she " had" cancer.  We reviewed pathology report in detail.  She had T2, grade 3 tumor with Oncotype DX of 18.  Chemotherapy had 1.6% benefit and was not offered.  Endocrine therapy was recommended by Dr. Morales (initial consult) and by me (second visit)  We discussed that there is a high likelihood of cancer recurrence and distant metastatic spread  leading to untimely death without adjuvant therapy.  Patient understood what was explained to her but was not quite convinced to start treatment.  I strongly encouraged her to consider second medical oncology opinion.  I also offered to call her spouse to explain that noncompliance with cancer treatment can lead to untimely death.  She did not want me to reach out to her spouse and stated  is on her side. She has very heavy menstrual bleeding.  We discussed side effects of tamoxifen as well as OS plus AI in detail.  I will favor OS plus AI given her young age of diagnosis and high risk disease.  She seems to be in favor of ovarian suppression but is also concerned about cardiac and bone toxicity.  We discussed cardio oncology and endocrinology management of side effects.   We also discussed recent approval of ribociclib for patients with high risk breast cancer.  Patient meets the criteria for adjuvant ribociclib (T2 N0 grade 3 tumor).   Despite a long discussion, patient seems to have conflicted opinion about adjuvant endocrine therapy We discussed to obtain a PET/CT and, follow-up with me in 1 to 2 weeks.   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  We will monitor closely if ovarian suppression helps with inflammation and cysts.  Periods are regular, LMP 11/2024,  using condoms, stopped OCP after dx. Has 2 kids - 15 yrs and 5 yrs. Using condoms. Doesnt want more kids   1/2025 CT CAP and bone scan 1/2025 JAMILA Here to start lupron today S/e reviewed AI to start 3/2025, ribo 4/2025 pt in agreement   We discussed cardio oncology and endocrinology management of side effects.   We also discussed recent approval of ribociclib for patients with high risk breast cancer.  Patient meets the criteria for adjuvant ribociclib (T2 N0 grade 3 tumor).  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  We will monitor closely if ovarian suppression helps with inflammation and cysts.    3/12/2025 She is here for lupron # 3  LMP 1/2025 She has mild muscle and joint issues. She is walking on treadmill x 60 min, 5 days a week mild hot flashes  discussed to start AI  I recommend Arimidex 1 mg daily for 5-10 years. I discussed the risks and benefits of aromatase inhibitor therapy including fatigue, coronary artery disease, hyperlipidemia, vaginal dryness, mood changes, hot flashes, GI disturbances, arthralgias, myalgias, and osteoporosis. I will obtain bone density scan to evaluate her bone health prior to starting anastrozole. I recommend her to continue calcium and vitamin D supplementation.  We will see her back in 2 months to start ribociclib at next visit. Will check blood work including hormone levels at next visit Will make a referral for cardio oncology and bone density in the next 6 to 12 months. Periods are regular, LMP 1/2025  using condoms, stopped OCP after dx. Has 2 kids - 15 yrs and 5 yrs. Using condoms. Doesnt want more kids  5/2025 She started lupron Jan 2025 She has had 4 injections She is still having regular periods 3/19/25 x 8 days  4/21/2025 x 6 days  estradiol 4/2025 35 She started anastrozole in 3/2025. No s/e If she continues to mensurate despite lupron, would consider BSO or switching to tamoxifen  Estradiol is lower than what is expected at her age, likely 2/2 lupron effect. Would give 2 more months  lupron today and next month f/u with me in 1 m to see if she is still menstruating  Will change to [place] next month if she gets another period this month  Will wait to add ribo until next visit  [de-identified] : This is a very pleasant 38-year-old premenopausal lady diagnosed with stage IA T2 N0 MX ER/IN strongly positive HER2/prieto negative poorly differentiated invasive ductal carcinoma, status post bilateral mastectomies with reconstruction 7/2023, completed radiation 11/2023 (margin issue).  BRCA panel test negative.  Oncotype DX 18.  She met with Dr. Morales 9/2023 and Tamoxifen was prescribed. Patient never started tamoxifen and saw me for first visit 2/2024.  We reviewed pathology and strongly recommended endocrine therapy.  Options for tamoxifen versus OS plus AI reviewed. CT JAMILA 1/1025. Lupron started 1/2025. AI 3/2025.    6/11/2025 Patient presents today to evaluate Lupron and her menstrual cycle. Lupron started January 2025.  She came for 6th injection yesterday.  She reports having regular menstrual bleeding despite being on Lupron.  Last menstrual period was May 27 to May 31.  She is expecting next menstrual period  On July 8.  We discussed that if she continues to have bleeding despite 6 Lupron injections, this would be considered Lupron failure.  We discussed option for switching back to tamoxifen or consider BSO.  She is not planning to have more children.  She has a teenage daughter who is graduating from high school and is going to nursing school.  She is interested in BSO.  Patient to follow-up with me July 9 to discuss next steps depending on expected  Menstrual period on July 8th.  Blood  work was performed April 2025 where estrogen level was elevated at 35.  We will repeat blood work in July. She is taking anastrozole for now without any side effects. We will continue to discuss ribociclib as well as she meets the criteria.  At this time given overwhelming issues related to menstrual bleeding, we will hold off

## 2025-06-11 NOTE — REASON FOR VISIT
[Follow-Up Visit] : a follow-up [Other: _____] : [unfilled] [Other:____] : [unfilled] [Home] : at home, [unfilled] , at the time of the visit. [Medical Office: ([facility])___] : at the medical office located in  [Telehealth (audio & video)] : This visit was provided via telehealth using real-time 2-way audio visual technology. [Verbal consent obtained from patient] : the patient, [unfilled] [FreeTextEntry2] : BREAST CA

## 2025-06-11 NOTE — REASON FOR VISIT
[Follow-Up Visit] : a follow-up [Other: _____] : [unfilled] [Other:____] : [unfilled] [Home] : at home, [unfilled] , at the time of the visit. [Medical Office: ([facility])___] : at the medical office located in  [Telehealth (audio & video)] : This visit was provided via telehealth using real-time 2-way audio visual technology. [Verbal consent obtained from patient] : the patient, [unfilled] [FreeTextEntry2] : BREAST CA

## 2025-06-11 NOTE — ASSESSMENT
[FreeTextEntry1] : This is a very pleasant 37-year-old premenopausal lady diagnosed with stage IA T2 N0 MX ER/IN strongly positive HER2/prieto negative moderate to poorly differentiated invasive ductal carcinoma, status post bilateral mastectomies with reconstruction 7/2023, completed radiation 11/2023 (margin issue).  BRCA panel test negative.  Oncotype DX 18.  She met with Dr. Morales 9/2023 and tamoxifen was prescribed but pt never started. CT JAMILA 1/1025. Lupron started 1/2025. AI 3/2025  3/12/2025 She is here for lupron # 3  LMP 1/2025 She has mild muscle and joint issues. She is walking on treadmill x 60 min, 5 days a week mild hot flashes  discussed to start AI  I recommend Arimidex 1 mg daily for 5-10 years. I discussed the risks and benefits of aromatase inhibitor therapy including fatigue, coronary artery disease, hyperlipidemia, vaginal dryness, mood changes, hot flashes, GI disturbances, arthralgias, myalgias, and osteoporosis. I will obtain bone density scan to evaluate her bone health prior to starting anastrozole. I recommend her to continue calcium and vitamin D supplementation.  We will see her back in 2 months to start ribociclib at next visit. Will check blood work including hormone levels at next visit Will make a referral for cardio oncology and bone density in the next 6 to 12 months. Periods are regular, LMP 1/2025  using condoms, stopped OCP after dx. Has 2 kids - 15 yrs and 5 yrs. Using condoms. Doesnt want more kids rto 2m  continue lupron every month     6/11/2025 Patient presents today to evaluate Lupron and her menstrual cycle. Lupron started January 2025.  She came for 6th injection yesterday.  She reports having regular menstrual bleeding despite being on Lupron.  Last menstrual period was May 27 to May 31.  She is expecting next menstrual period  On July 8.  We discussed that if she continues to have bleeding despite 6 Lupron injections, this would be considered Lupron failure.  We discussed option for switching back to tamoxifen or consider BSO.  She is not planning to have more children.  She has a teenage daughter who is graduating from high school and is going to nursing school.  She is interested in BSO.  Patient to follow-up with me July 9 to discuss next steps depending on expected  Menstrual period on July 8th.  Blood  work was performed April 2025 where estrogen level was elevated at 35.  We will repeat blood work in July. She is taking anastrozole for now without any side effects. We will continue to discuss ribociclib as well as she meets the criteria.  At this time given overwhelming issues related to menstrual bleeding, we will hold off

## 2025-06-11 NOTE — CONSULT LETTER
[Dear  ___] : Dear  [unfilled], [Courtesy Letter:] : I had the pleasure of seeing your patient, [unfilled], in my office today. [Please see my note below.] : Please see my note below. [Consult Closing:] : Thank you very much for allowing me to participate in the care of this patient.  If you have any questions, please do not hesitate to contact me. [Sincerely,] : Sincerely, [FreeTextEntry3] : Sita Sol MD Attending Physician, Division of Medical Oncology and Hematology Medical Director, Center for Cancer, Pregnancy and Reproduction Medical Director, Breast Wellness and Cancer Prevention Program  JADEN [facility] [place] , [employer] at [facility]

## 2025-06-11 NOTE — CONSULT LETTER
[Dear  ___] : Dear  [unfilled], [Courtesy Letter:] : I had the pleasure of seeing your patient, [unfilled], in my office today. [Please see my note below.] : Please see my note below. [Consult Closing:] : Thank you very much for allowing me to participate in the care of this patient.  If you have any questions, please do not hesitate to contact me. [Sincerely,] : Sincerely, [FreeTextEntry3] : Sita Sol MD Attending Physician, Division of Medical Oncology and Hematology Medical Director, Center for Cancer, Pregnancy and Reproduction Medical Director, Breast Wellness and Cancer Prevention Program  JADEN [facility] [place] , [employer] at [facility]

## 2025-06-11 NOTE — PHYSICAL EXAM
[Fully active, able to carry on all pre-disease performance without restriction] : Status 0 - Fully active, able to carry on all pre-disease performance without restriction [Normal] : affect appropriate [de-identified] : s/p B/L MRM with LARISA reconstruction - well healed scars, no palp masses. B/L ax neg [de-identified] : Multiple subcentimeter cysts on anterior chest and arms

## 2025-06-11 NOTE — PHYSICAL EXAM
[Fully active, able to carry on all pre-disease performance without restriction] : Status 0 - Fully active, able to carry on all pre-disease performance without restriction [Normal] : affect appropriate [de-identified] : s/p B/L MRM with LARISA reconstruction - well healed scars, no palp masses. B/L ax neg [de-identified] : Multiple subcentimeter cysts on anterior chest and arms

## 2025-06-11 NOTE — REASON FOR VISIT
[Follow-Up Visit] : a follow-up [Other: _____] : [unfilled] [Other:____] : [unfilled] [Home] : at home, [unfilled] , at the time of the visit. [Medical Office: ([facility])___] : at the medical office located in  [Telehealth (audio & video)] : This visit was provided via telehealth using real-time 2-way audio visual technology. [Verbal consent obtained from patient] : the patient, [unfilled] [FreeTextEntry2] : BREAST CA

## 2025-06-11 NOTE — PHYSICAL EXAM
[Fully active, able to carry on all pre-disease performance without restriction] : Status 0 - Fully active, able to carry on all pre-disease performance without restriction [Normal] : affect appropriate [de-identified] : s/p B/L MRM with LARISA reconstruction - well healed scars, no palp masses. B/L ax neg [de-identified] : Multiple subcentimeter cysts on anterior chest and arms

## 2025-06-11 NOTE — ASSESSMENT
[FreeTextEntry1] : This is a very pleasant 37-year-old premenopausal lady diagnosed with stage IA T2 N0 MX ER/WA strongly positive HER2/prieto negative moderate to poorly differentiated invasive ductal carcinoma, status post bilateral mastectomies with reconstruction 7/2023, completed radiation 11/2023 (margin issue).  BRCA panel test negative.  Oncotype DX 18.  She met with Dr. Morales 9/2023 and tamoxifen was prescribed but pt never started. CT JAMILA 1/1025. Lupron started 1/2025. AI 3/2025  3/12/2025 She is here for lupron # 3  LMP 1/2025 She has mild muscle and joint issues. She is walking on treadmill x 60 min, 5 days a week mild hot flashes  discussed to start AI  I recommend Arimidex 1 mg daily for 5-10 years. I discussed the risks and benefits of aromatase inhibitor therapy including fatigue, coronary artery disease, hyperlipidemia, vaginal dryness, mood changes, hot flashes, GI disturbances, arthralgias, myalgias, and osteoporosis. I will obtain bone density scan to evaluate her bone health prior to starting anastrozole. I recommend her to continue calcium and vitamin D supplementation.  We will see her back in 2 months to start ribociclib at next visit. Will check blood work including hormone levels at next visit Will make a referral for cardio oncology and bone density in the next 6 to 12 months. Periods are regular, LMP 1/2025  using condoms, stopped OCP after dx. Has 2 kids - 15 yrs and 5 yrs. Using condoms. Doesnt want more kids rto 2m  continue lupron every month     6/11/2025 Patient presents today to evaluate Lupron and her menstrual cycle. Lupron started January 2025.  She came for 6th injection yesterday.  She reports having regular menstrual bleeding despite being on Lupron.  Last menstrual period was May 27 to May 31.  She is expecting next menstrual period  On July 8.  We discussed that if she continues to have bleeding despite 6 Lupron injections, this would be considered Lupron failure.  We discussed option for switching back to tamoxifen or consider BSO.  She is not planning to have more children.  She has a teenage daughter who is graduating from high school and is going to nursing school.  She is interested in BSO.  Patient to follow-up with me July 9 to discuss next steps depending on expected  Menstrual period on July 8th.  Blood  work was performed April 2025 where estrogen level was elevated at 35.  We will repeat blood work in July. She is taking anastrozole for now without any side effects. We will continue to discuss ribociclib as well as she meets the criteria.  At this time given overwhelming issues related to menstrual bleeding, we will hold off

## 2025-07-08 NOTE — PHYSICAL EXAM
[NI] : Normal [de-identified] : Breasts healing well fat grafting take breasts closer in size and superior pole better contour.  Nipple healing well [de-identified] : Abd healing well no sign of infection

## 2025-07-08 NOTE — ADDENDUM
[FreeTextEntry1] :  I KVNG Gurrola acted as a scribe for Dr. Edmund Solomon for this patient visit.

## 2025-07-08 NOTE — HISTORY OF PRESENT ILLNESS
[FreeTextEntry1] : Pt s/p fat grafting to bilateral breasts and fat grafting to right nipple and revision of bilateral abdominal dog ears.  Pt doing well no complaints Happy with the results.

## 2025-07-08 NOTE — PHYSICAL EXAM
[NI] : Normal [de-identified] : Breasts healing well fat grafting take breasts closer in size and superior pole better contour.  Nipple healing well [de-identified] : Abd healing well no sign of infection

## 2025-07-08 NOTE — REASON FOR VISIT
[Post Op: _________] : a [unfilled] post op visit [FreeTextEntry1] : The patient is a 38-year-old female s/p: right breast and right nipple, fat grafting to superior pole of left breast and revision of bilateral dog ears. The patient denies any fever, drainage, itching, chills or bleeding. The patient states she has no major concerns/complaints at this time.

## 2025-07-13 NOTE — CONSULT LETTER
[Dear  ___] : Dear  [unfilled], [Courtesy Letter:] : I had the pleasure of seeing your patient, [unfilled], in my office today. [Please see my note below.] : Please see my note below. [Consult Closing:] : Thank you very much for allowing me to participate in the care of this patient.  If you have any questions, please do not hesitate to contact me. [Sincerely,] : Sincerely, [FreeTextEntry3] : Sita Sol MD Attending Physician, Division of Medical Oncology and Hematology Medical Director, Center for Cancer, Pregnancy and Reproduction Medical Director, Breast Wellness and Cancer Prevention Program  [facility] [place] , [employer] at [facility]

## 2025-07-13 NOTE — ASSESSMENT
Summary of recommendations:  -- Lymph node and bone marrow biopsy demonstrate evidence of mantle cell lymphoma  -- He will receive day 2 of rituximab and Bendamustine as of today        --------------------------    [facility]  Hematology/Oncology Clinic  Follow-up Visit Note     CC: Mantle cell lymphoma, pleomorphic variant    HISTORY OF PRESENT ILLNESS:  Hoang Mei is a 63 year old male with a diagnosis of mantle cell lymphoma lymphoma.  The oncologic history is as follows:    Oncologic history:  -- 1/7/2025  Initial presentation to [facility] in context of abdominal discomfort, fatigue with CT findings of extensive adenopathy, and significant hepatosplenomegaly; labs demonstrate pancytopenia with platelet count 10-20 K  -- 1/8/2025 bone marrow biopsy, touch imprint, aspirate and clot section -Mantle cell lymphoma, pleomorphic variants (at least 60% involvement), with hypercellular marrow with trilineage hematopoiesis, mild dyspoiesis, and virtually absent iron stores.  -- 1/11/2025 representation to [facility] emergency department in context of fevers, flushing, inability to tolerate oral intake, CT imaging demonstrating evidence of periportal edema, otherwise no acute findings, with extensive abdominal lymphadenopathy and splenomegaly  -- 1/13/2025 lymph node, supraclavicular, core biopsy -involved by patient's known mantle cell lymphoma  -- 1/17/2025 MRI brain with and without contrast -small foci of acute ischemia along the left frontal centrum semiovale and left parietal lobe cortex, with extensive bone marrow signal alteration, suspicious for osseous metastatic disease, no pathologic parenchymal or leptomeningeal enhancement    Patient is a pleasant 63-year-old man readmitted to the hospitalist service in context of diagnosis of mantle cell lymphoma as above.  Unfortunately, he is hospitalized now in context of CVA.  He has initiated treatment with rituximab and  [FreeTextEntry1] : This is a very pleasant 37-year-old premenopausal lady diagnosed with stage IA T2 N0 MX ER/WA strongly positive HER2/prieto negative moderate to poorly differentiated invasive ductal carcinoma, status post bilateral mastectomies with reconstruction 7/2023, completed radiation 11/2023 (margin issue).  BRCA panel test negative.  Oncotype DX 18.  She met with Dr. Morales 9/2023 and tamoxifen was prescribed but pt never started. CT JAMILA 1/1025. Lupron started 1/2025. AI 3/2025  3/12/2025 She is here for lupron # 3  LMP 1/2025 She has mild muscle and joint issues. She is walking on treadmill x 60 min, 5 days a week mild hot flashes  discussed to start AI  I recommend Arimidex 1 mg daily for 5-10 years. I discussed the risks and benefits of aromatase inhibitor therapy including fatigue, coronary artery disease, hyperlipidemia, vaginal dryness, mood changes, hot flashes, GI disturbances, arthralgias, myalgias, and osteoporosis. I will obtain bone density scan to evaluate her bone health prior to starting anastrozole. I recommend her to continue calcium and vitamin D supplementation.  We will see her back in 2 months to start ribociclib at next visit. Will check blood work including hormone levels at next visit Will make a referral for cardio oncology and bone density in the next 6 to 12 months. Periods are regular, LMP 1/2025  using condoms, stopped OCP after dx. Has 2 kids - 15 yrs and 5 yrs. Using condoms. Doesnt want more kids rto 2m  continue lupron every month     6/11/2025 Patient presents today to evaluate Lupron and her menstrual cycle. Lupron started January 2025.  She came for 6th injection yesterday.  She reports having regular menstrual bleeding despite being on Lupron.  Last menstrual period was May 27 to May 31.  She is expecting next menstrual period  On July 8.  We discussed that if she continues to have bleeding despite 6 Lupron injections, this would be considered Lupron failure.  We discussed option for switching back to tamoxifen or consider BSO.  She is not planning to have more children.  She has a teenage daughter who is graduating from high school and is going to nursing school.  She is interested in BSO.  Patient to follow-up with me July 9 to discuss next steps depending on expected  Menstrual period on July 8th.  Blood  work was performed April 2025 where estrogen level was elevated at 35.  We will repeat blood work in July. She is taking anastrozole for now without any side effects. We will continue to discuss ribociclib as well as she meets the criteria.  At this time given overwhelming issues related to menstrual bleeding, we will hold off   7/2025 Patient has been on Lupron since January 2025.  She continues to have irregular menstrual bleeding despite 6 months of Lupron.  She is on the full dose and she is compliant.  We discussed Lupron failure and BSO is recommended. Check FSH/E. Patient had seen Dr. Casey last year and will follow-up with her for BSO.  We discussed switching to tamoxifen in the meantime but patient is unwilling. She is worried about toxicity  LMP 6/2025 We will continue to discuss ribociclib as well as she meets the criteria.  At this time given overwhelming issues related to menstrual bleeding, we will hold off Bendamustine as of 1/20/2025, he is tolerating treatment fairly well, denies any specific toxicities of therapy.  He has questions regarding expected further clinical course.    Patient Active Problem List    Diagnosis Date Noted    Acute ischemic stroke  (CMD) 01/17/2025     Priority: Low    Mantle cell lymphoma (CMD) 01/14/2025     Priority: Low    Iron deficiency anemia due to chronic blood loss 01/14/2025     Priority: Low    Lactic acidosis 01/11/2025     Priority: Low    Thrombocytopenia (CMD) 01/07/2025     Priority: Low    Falls 12/12/2024     Priority: Low    Kyphosis of cervical region 12/12/2024     Priority: Low    Prediabetes 06/05/2024     Priority: Low    Elevated AST (SGOT) 06/05/2024     Priority: Low    History of myocardial infarction 05/29/2024     Priority: Low    History of moderate sun exposure 05/29/2024     Priority: Low    REM behavioral disorder 05/29/2024     Priority: Low     Seeing sleep medicine  They started on xanax at night time, he notes it would \"zonk him\" for 2 days with 1/2 a tablet. In general, has been avoiding taking this due to those side effects.      Erectile dysfunction 05/29/2024     Priority: Low     Endorses difficulty getting & maintaining erections  Interested in medication for this if possible      Annual physical exam 05/29/2024     Priority: Low    Preventative health care 05/29/2024     Priority: Low    Atherosclerosis of coronary artery bypass graft of native heart without angina pectoris 02/29/2024     Priority: Low     History of CAD s/p 2 vessel CABG 6/25/2015 with LIMA to LAD and vein-graft to diagonal  Has been following with Cardiology since that time  Denies any recent CP, MOSS, PND, orthopnea  Notes his activity has been limited due to his Parkinson's disease  Takes ASA 81mg daily and rosuvastatin 40mg daily without side effects      Orthostatic hypotension 02/04/2022     Priority: Low     History of severe orthostatic hypotension with more challenging  control over the past 1-2 years  Previously on: midodrine 10mg BID, florinef, droxidopa 100mg BID  Had a period of severe hypertension - stopped florinef after this & decreased midodrine to 5mg BID  Since last appointment, the lightheadedness & dizziness has been stable (despite increasing his carbidopa-levodopa). He had a few episodes after coming up a flight of stairs. Trying to adjust, where if he goes from sitting --> standing to stay there for a few minutes. Most consistent issue is if bending over & having to get all the way back up OR if takes the stairs multiple times.   Has been working to drink more fluids during the day - getting mixture of water & electrolytes (liquid IV, tomato juice etc)  Has been getting his droxidopa & midodrine through the [facility] pharmacy as significantly cheaper than through insurance      Status post aorto-coronary artery bypass graft 09/29/2017     Priority: Low     2 vessel CABG on 06/29/2015 with a lima to the LAD and a vein graft to a diagonal      Varicose veins of both lower extremities without ulcer or inflammation 09/29/2017     Priority: Low    Parkinson disease  (CMD) 08/12/2015     Priority: Low     Onset 2014 with tremor  Diagnosed with parkinson's disease formally in 2015  Has had a fast progression of his disease course  Was following with Neurology in [place] - needs a new one here  Currently taking: Carbidopa-levodopa 25-100mg 2 tablets every 4 hours (increased from 1.5 tablets every 4 hours at his last Neurology visit)  Has noticed improvement in how he has been feeling with higher dosage of sinemet  Following with Neurology        Hyperlipidemia 07/16/2015     Priority: Low    Mild obstructive sleep apnea 02/28/2012     Priority: Low     History of mixed CALIXTO & central apnea, has a positional nature to symptoms  Has had sleep study done multiple times - needs just a titration for fitting of machine  Endorses significant daytime fatigue           Past Medical  History:   Diagnosis Date    Hyperlipoproteinemia     Hypotension     Old MI (myocardial infarction)     Parkinson's disease  (CMD)     Sleep apnea     wears CPAP    Varicose veins of both lower extremities      Past Surgical History:   Procedure Laterality Date    Coronary artery bypass graft  2015    Repair umbilical vick,<6y/o,reduc       Family History   Problem Relation Age of Onset    Hypertension Mother     Parkinsonism Father     Heart disease Sister         stent placement    Myocardial Infarction Sister     Diabetes Sister     Cancer, Prostate Neg Hx     Cancer, Breast Neg Hx     Cancer, Ovarian Neg Hx     Cancer, Colon Neg Hx      ALLERGIES:   Allergen Reactions    Metoclopramide Other (See Comments)     fatigue       Social History     Social History Narrative    Not on file       Current Facility-Administered Medications   Medication Dose Route Frequency Provider Last Rate Last Admin    sodium chloride 0.9 % flush bag 100 mL  100 mL Intravenous Once PRN Matti Dejesus MD        sodium chloride (NORMAL SALINE) 0.9 % bolus 1,000 mL  1,000 mL Intravenous Once PRN Matti Dejesus MD        sodium chloride (NORMAL SALINE) 0.9 % bolus 1,000 mL  1,000 mL Intravenous Once PRN Matti Dejesus MD        EPINEPHrine (ADRENALIN) injection 0.3 mg  0.3 mg Intramuscular Q5 Min PRN Matti Dejesus MD        diphenhydrAMINE (BENADRYL) injection 50 mg  50 mg Intravenous Once PRN Matti Dejesus MD        famotidine (PEPCID) injection 20 mg  20 mg Intravenous Once PRN Matti Dejesus MD        methylPREDNISolone (SOLU-Medrol) injection 125 mg  125 mg Intravenous Once PRN Matti Dejesus MD        albuterol inhaler 2 puff  2 puff Inhalation Q20 Min PRN Matti Dejesus MD        morphine injection 2 mg  2 mg Intravenous PRN Matti Dejesus MD        acetaminophen (TYLENOL) tablet 650 mg  650 mg Oral PRN Matti Dejesus MD   650 mg at 01/21/25 1710    diphenhydrAMINE (BENADRYL) injection 25 mg  25 mg Intravenous Q6H PRN  Kari Martinez MD   25 mg at 01/21/25 1725    sodium chloride 0.9 % injection 10 mL  10 mL Injection 2 times per day Kari Martinez MD   10 mL at 01/21/25 2021    sodium chloride 0.9 % injection 10 mL  10 mL Injection PRN Kari Martinez MD        sodium chloride 0.9 % injection 20 mL  20 mL Injection PRN Kari Martinez MD        sodium chloride 0.9 % flush bag 100 mL  100 mL Intravenous Once PRN Matti Dejesus MD        [START ON 1/22/2025] ondansetron (ZOFRAN) tablet 8 mg  8 mg Oral BID Matti Dejesus MD        sodium chloride (NORMAL SALINE) 0.9 % bolus 1,000 mL  1,000 mL Intravenous Once PRN Matti Dejesus MD        sodium chloride (NORMAL SALINE) 0.9 % bolus 1,000 mL  1,000 mL Intravenous Once PRN Matti Dejesus MD        EPINEPHrine (ADRENALIN) injection 0.3 mg  0.3 mg Intramuscular Q5 Min PRN Matti Dejesus MD        diphenhydrAMINE (BENADRYL) injection 50 mg  50 mg Intravenous Once PRN Matti Dejesus MD        famotidine (PEPCID) injection 20 mg  20 mg Intravenous Once PRN Matti Dejesus MD        methylPREDNISolone (SOLU-Medrol) injection 125 mg  125 mg Intravenous Once PRN Matti Dejesus MD        albuterol inhaler 2 puff  2 puff Inhalation Q20 Min PRN Matti Dejesus MD        morphine injection 2 mg  2 mg Intravenous PRN Matti Dejesus MD        acyclovir (ZOVIRAX) tablet 400 mg  400 mg Oral 2 times per day Rhoda Sanchez PA-C   400 mg at 01/21/25 2019    sulfamethoxazole-trimethoprim (BACTRIM DS) 800-160 MG tablet 1 tablet  1 tablet Oral Once per day on Monday Wednesday Friday Rhoda Sanchez PA-C   1 tablet at 01/20/25 1335    bisacodyl (DULCOLAX) EC tablet 5 mg  5 mg Oral Daily PRN Kari Martinez MD   5 mg at 01/21/25 0847    sodium chloride 0.9 % injection 10 mL  10 mL Intravenous PRN Kari Martinez MD        Potassium Standard Replacement Protocol (Levels 3.5 and lower)   Does not apply See Admin Instructions Kari Martinez MD        Magnesium Standard Replacement Protocol   Does  not apply See Admin Instructions Kari Martinez MD        Phosphorus Standard Replacement Protocol   Does not apply See Admin Instructions Kari Martinez MD        docusate sodium-sennosides (SENOKOT S) 50-8.6 MG 2 tablet  2 tablet Oral Daily PRN Kari Martinez MD   2 tablet at 01/21/25 1702    thiamine (VITAMIN B-1) injection 100 mg  100 mg Intravenous Daily Gayla Webb MD   100 mg at 01/21/25 0841    acetaminophen (TYLENOL) tablet 650 mg  650 mg Oral Q6H PRN Gayla Webb MD   650 mg at 01/19/25 1427    polyethylene glycol (MIRALAX) packet 17 g  17 g Oral BID PRN Gayla Webb MD   17 g at 01/20/25 0953    sodium chloride 0.9 % injection 10 mL  10 mL Intravenous PRN Charles Doshi MD        sodium chloride 0.9 % injection 2 mL  2 mL Intracatheter 2 times per day Charles Doshi MD   2 mL at 01/21/25 2020    sodium chloride 0.9 % injection 10 mL  10 mL Intravenous PRN Ramez Cuba MD        sodium chloride (NORMAL SALINE) 0.9 % bolus 500 mL  500 mL Intravenous PRN Ramez Cuba MD        atorvastatin (LIPITOR) tablet 80 mg  80 mg Oral Nightly Ramez Cuba MD   80 mg at 01/21/25 2019    carbidopa-levodopa (SINEMET CR)  MG per CR tablet 2 tablet  2 tablet Oral Nightly Ramez Cuba MD   2 tablet at 01/21/25 2019    ezetimibe (ZETIA) tablet 10 mg  10 mg Oral Daily Ramez Cuba MD   10 mg at 01/21/25 0840    midodrine (PROAMATINE) tablet 5 mg  5 mg Oral BID Ramez Cuba MD   5 mg at 01/21/25 1139    pantoprazole (PROTONIX) EC tablet 40 mg  40 mg Oral QAM AC Ramez Cuba MD   40 mg at 01/21/25 0448    carbidopa-levodopa (SINEMET)  MG per tablet 2 tablet  2 tablet Oral 6x Daily Ramez Cuba MD   2 tablet at 01/21/25 2019    melatonin tablet 9 mg  9 mg Oral Nightly PRN Ramez Cuba MD        hydrOXYzine (ATARAX) tablet 10 mg  10 mg Oral Q6H PRN Chi Mcghee MD   10 mg at  01/21/25 1854    droxidopa (NORTHERA) capsule 100 mg  100 mg Oral BID Chi Mcghee MD   100 mg at 01/21/25 1700     Facility-Administered Medications Ordered in Other Encounters   Medication Dose Route Frequency Provider Last Rate Last Admin    hydrOXYzine (ATARAX) tablet 10 mg  10 mg Oral Q6H PRN Chi Mcghee MD           REVIEW OF SYSTEMS:  Reviewed from nurse’s notes and concur.      PHYSICAL EXAMINATION:   Oncology Encounter Vitals   ONC OP Encounter Vitals Group      BP 01/17/25 0945 109/59      Heart Rate 01/17/25 0945 94      Resp 01/17/25 1001 18      Temp 01/17/25 1030 98.5 °F (36.9 °C)      Temp src 01/17/25 1030 Oral      SpO2 01/17/25 0945 100 %      Weight 01/17/25 1030 155 lb (70.3 kg)      Height 01/17/25 1030 6' (1.829 m)      Pain Score --       Pain Location --       Pain Education? --       BSA (Calculated - m2) - [employer] 01/17/25 1030 1.91      BSA (Calculated - sq m) 01/17/25 1030 1.89      BMI (Calculated) 01/17/25 1030 21.02   ECOG 2  General Appearance - Alert, fatigued and chronically ill appearing, although in no distress.  Mental Status - Alert, oriented to person, place, and time.   Eyes - Pupils equal and reactive, extraocular eye movements intact.   Mouth - Mucous membranes moist, pharynx normal without lesions.  Neck - Supple, no significant adenopathy.   Lymphatics - No palpable lymphadenopathy.  Chest - Clear to auscultation, no wheezes, rales or rhonchi, symmetric air entry.  Heart - Normal rate, regular rhythm, normal S1, S2, no murmurs, rubs, clicks or gallops.   Abdomen - Soft, nontender, nondistended, no masses or organomegaly.   Neurological - Alert, oriented, +hypophonia, + bradykinesia  Musculoskeletal - No joint tenderness, deformity or swelling.  Extremities - Peripheral pulses normal, no pedal edema, no clubbing or cyanosis.  Skin - Normal coloration and turgor, no rashes, no suspicious skin lesions noted.     Labs reviewed and discussed with  patient:  WBC (K/mcL)   Date Value   01/21/2025 4.6     RBC (mil/mcL)   Date Value   01/21/2025 1.91 (L)     HCT (%)   Date Value   01/21/2025 22.5 (L)     HGB (g/dL)   Date Value   01/21/2025 7.1 (L)     PLT (K/mcL)   Date Value   01/21/2025 29 (LL)     Sodium (mmol/L)   Date Value   01/21/2025 139     Potassium (mmol/L)   Date Value   01/21/2025 4.5     Chloride (mmol/L)   Date Value   01/21/2025 99     Glucose (mg/dL)   Date Value   01/21/2025 132 (H)     Calcium (mg/dL)   Date Value   01/21/2025 8.4     Carbon Dioxide (mmol/L)   Date Value   01/21/2025 26     BUN (mg/dL)   Date Value   01/21/2025 20     Creatinine (mg/dL)   Date Value   01/21/2025 0.56 (L)       GOT/AST (Units/L)   Date Value   01/21/2025 38 (H)     GPT/ALT (Units/L)   Date Value   01/21/2025 11     No results found for: \"GGTP\"  Alkaline Phosphatase (Units/L)   Date Value   01/21/2025 99     Bilirubin, Total (mg/dL)   Date Value   01/21/2025 0.5           Imaging results reviewed and discussed with patient, as discussed above as per HPI    ASSESSMENT AND PLAN:  In summary, Hoang Mei is a 63 year old male with a history of Parkinson's disease, presenting currently in context of new diagnosis of mantle cell lymphoma, pleomorphic variant.  Unfortunately, this reflects a particularly aggressive variant of lymphoma, for which treatment is typically given with palliative intent.  I acknowledged that although this entity remains treatable, this is generally not considered curable, and typically chemotherapy with immunotherapy is administered.  Acknowledging patient's marginal performance status (i.e., ECOG 2 in context of Parkinson's disease), we have initiated treatment with rituximab and Bendamustine, and he will receive day 2 of cycle 1 as of today.    We will additionally plan to administer IV iron on an intermittent basis in context of his iron deficiency.    I will continue to follow with you during remainder of patient's hospitalization,  please do not hesitate to reach out with questions or concerns.    Matti Dejesus MD

## 2025-07-13 NOTE — ASSESSMENT
[FreeTextEntry1] : This is a very pleasant 37-year-old premenopausal lady diagnosed with stage IA T2 N0 MX ER/IN strongly positive HER2/prieto negative moderate to poorly differentiated invasive ductal carcinoma, status post bilateral mastectomies with reconstruction 7/2023, completed radiation 11/2023 (margin issue).  BRCA panel test negative.  Oncotype DX 18.  She met with Dr. Morales 9/2023 and tamoxifen was prescribed but pt never started. CT JAMILA 1/1025. Lupron started 1/2025. AI 3/2025  3/12/2025 She is here for lupron # 3  LMP 1/2025 She has mild muscle and joint issues. She is walking on treadmill x 60 min, 5 days a week mild hot flashes  discussed to start AI  I recommend Arimidex 1 mg daily for 5-10 years. I discussed the risks and benefits of aromatase inhibitor therapy including fatigue, coronary artery disease, hyperlipidemia, vaginal dryness, mood changes, hot flashes, GI disturbances, arthralgias, myalgias, and osteoporosis. I will obtain bone density scan to evaluate her bone health prior to starting anastrozole. I recommend her to continue calcium and vitamin D supplementation.  We will see her back in 2 months to start ribociclib at next visit. Will check blood work including hormone levels at next visit Will make a referral for cardio oncology and bone density in the next 6 to 12 months. Periods are regular, LMP 1/2025  using condoms, stopped OCP after dx. Has 2 kids - 15 yrs and 5 yrs. Using condoms. Doesnt want more kids rto 2m  continue lupron every month     6/11/2025 Patient presents today to evaluate Lupron and her menstrual cycle. Lupron started January 2025.  She came for 6th injection yesterday.  She reports having regular menstrual bleeding despite being on Lupron.  Last menstrual period was May 27 to May 31.  She is expecting next menstrual period  On July 8.  We discussed that if she continues to have bleeding despite 6 Lupron injections, this would be considered Lupron failure.  We discussed option for switching back to tamoxifen or consider BSO.  She is not planning to have more children.  She has a teenage daughter who is graduating from high school and is going to nursing school.  She is interested in BSO.  Patient to follow-up with me July 9 to discuss next steps depending on expected  Menstrual period on July 8th.  Blood  work was performed April 2025 where estrogen level was elevated at 35.  We will repeat blood work in July. She is taking anastrozole for now without any side effects. We will continue to discuss ribociclib as well as she meets the criteria.  At this time given overwhelming issues related to menstrual bleeding, we will hold off   7/2025 Patient has been on Lupron since January 2025.  She continues to have irregular menstrual bleeding despite 6 months of Lupron.  She is on the full dose and she is compliant.  We discussed Lupron failure and BSO is recommended. Check FSH/E. Patient had seen Dr. Casey last year and will follow-up with her for BSO.  We discussed switching to tamoxifen in the meantime but patient is unwilling. She is worried about toxicity  LMP 6/2025 We will continue to discuss ribociclib as well as she meets the criteria.  At this time given overwhelming issues related to menstrual bleeding, we will hold off

## 2025-07-13 NOTE — CONSULT LETTER
[Dear  ___] : Dear  [unfilled], [Courtesy Letter:] : I had the pleasure of seeing your patient, [unfilled], in my office today. [Please see my note below.] : Please see my note below. [Consult Closing:] : Thank you very much for allowing me to participate in the care of this patient.  If you have any questions, please do not hesitate to contact me. [Sincerely,] : Sincerely, [FreeTextEntry3] : Sita Sol MD Attending Physician, Division of Medical Oncology and Hematology Medical Director, [facility], Pregnancy and Reproduction Medical Director, Breast Wellness and Cancer Prevention Program  [facility] [place] , [employer] [facility]

## 2025-07-13 NOTE — PHYSICAL EXAM
[Fully active, able to carry on all pre-disease performance without restriction] : Status 0 - Fully active, able to carry on all pre-disease performance without restriction [Normal] : affect appropriate [de-identified] : s/p B/L MRM with LARISA reconstruction - well healed scars, no palp masses. B/L ax neg [de-identified] : Multiple subcentimeter cysts on anterior chest and arms

## 2025-07-13 NOTE — PHYSICAL EXAM
[Fully active, able to carry on all pre-disease performance without restriction] : Status 0 - Fully active, able to carry on all pre-disease performance without restriction [Normal] : affect appropriate [de-identified] : s/p B/L MRM with LARISA reconstruction - well healed scars, no palp masses. B/L ax neg [de-identified] : Multiple subcentimeter cysts on anterior chest and arms

## 2025-07-13 NOTE — HISTORY OF PRESENT ILLNESS
[de-identified] : The patient's history of present illness began when she first palpated a right upper breast lump sometime in February 2023.  She reports having a primary care routine visit scheduled for March 2023 and at that time she was referred for a diagnostic mammogram and sonogram.  These were performed on 04/20/2023 with the mammogram finding bilateral heterogeneously dense breasts; no suspicious findings were noted in the left breast; there was global asymmetry involving the right breast; in the area of the palpable concern, there was an isodense mass that was microlobulated and containing calcifications; additionally in close proximity, there was another area of architectural distortion with calcifications; there were microcalcifications at the anterior right retroareolar region that appeared to be a group of amorphous microcalcifications of note.  A bilateral breast ultrasound performed on that same date noted in the right breast in the area of palpable concern at the 1 o'clock axis, 3 centimeters from nipple, there was an irregular hypoechoic mass with angular margins measuring 2 x 1 x 2.1 centimeters, indeterminate; additionally at the 1 o'clock axis 2 centimeters from nipple, there was an additional hypoechoic irregular mass that measured 1.7 x 0.9 x 1.6 centimeters; there were multiple other round hypoechoic avascular masses measuring less than 1 centimeter throughout the right breast; there was a right axillary lymph node with cortical thickness of 0.4 centimeters.  An ultrasound sound guided core biopsy of the breast masses and axillary lymph node was recommended.  The patient went on to have an ultrasound-guided core biopsy of the right breast 1 o'clock axis lesion 3 centimeters from nipple with a finding of invasive moderately differentiated ductal carcinoma measuring 13 millimeters, with evidence of DCIS, estrogen receptor positive (greater than 95 percent), progesterone receptor positive (greater than 95 percent), and HER-2/prieto equivocal (2+) with confirmatory FISH returning non amplified/negative. The right axillary lymph node core biopsy returned with lymph node negative for carcinoma.     The patient went on to have a bilateral breast MRI on 05/19/2023 with a finding of marked background enhancement limiting sensitivity of bilateral breasts; in the right breast, contiguous multiple suspicious masses and regional area of non mass enhancement predominantly involving the upper outer and upper central breast with extension to the lower central and lower breast measuring at least 5.8 x 5.2 x 8.2 centimeters with a biopsy marker in the cranial aspect of the 1 o'clock lesions corresponding to biopsy-proven cancer; additionally there was asymmetric enhancement of the right nipple concerning for disease extension into the nipple-areolar complex; there was non mass enhancement abutting the pectoralis muscle without enhancement of the muscle itself or chest wall to suggest involvement; there are other scattered enhancing nonspecific foci; in the left breast, there were scattered enhancing nonspecific foci but no suspicious enhancement of the left breast; there were abnormal right axillary level 1 and level 2 lymph nodes with round morphology and effaced fatty hilum with the largest level 1 lymph node containing a biopsy marker measuring 1.4 centimeters with additional enlarged left axillary 1 lymph nodes ranging in size from 1.4-1.6 centimeters; there was an enlarged 8 millimeter right internal mammary lymph node; there was also a fat containing left internal mammary lymph node.  The patient was seen in consultation on 6/8/23 regarding further treatment recommendations.    The patient has had an [employer] [facility] Diagnostic Cancer Genetic Predisposition Panel Test done with a finding of no pathogenic mutations, but multiple variants of unknown significance.  I had an extensive discussion with Ms. Ambriz regarding her newly diagnosed breast cancer, clinical T3 N0 Mx, clinical stage 2A breast cancer, noting the implications of a clinical and radiologic presentation on subsequent risk of developing local and metastatic recurrence.   In light of the above, I recommended proceeding with primary breast surgery at this time, and the patient noted she has already decided to have bilateral mastectomies.  I have noted that if she is surgically operable, which I believe she is at this time, that doing so may provide us more information regarding definitive pathologic prognostic factors which may then drive decision-making as to her potential risk of metastatic recurrence and recommendations for potential adjuvant therapies.  I have noted that should she have a tumor less than 5 centimeters, and lymph node negative, I would consider having her tumor sent off for Oncotype DX testing, having discussed this molecular profiling tool and its role in decision-making as to whether to add adjuvant chemotherapy to adjuvant antiestrogen therapy.  Should she have any lymph node involvement, I have noted that per the RxPonder study, all patients would be recommended to undergo adjuvant chemotherapy and consequently would not send that study off.  I have noted that should she have any of the above that would be an indication for chemotherapy, that I would deliver adjuvant chemotherapy to be determined based on her pathologic findings to potentially decrease the risk of metastatic recurrence.  This would then be followed by adjuvant antiestrogen therapy with Lupron to render her chemically menopausal followed by an aromatase inhibitor such as anastrozole.  The potential risks, benefits, anticipated side effects were preliminarily discussed.  I have noted that should she have node-negative cancer, and have a low risk score on Oncotype DX testing, primary adjuvant antiestrogen therapy alone would be considered.   I asked the patient in advance if percarmandoce she requires adjuvant chemotherapy, whether she wishes any further childbearing and she has replied no and consequently will not refer her for fertility preservation consultation at this time.   I also noted that should her PET-CT scan return with evidence of metastatic disease, above recommendations would defer greatly.  I also discussed the potential role of adjuvant radiation therapy, even in the postmastectomy setting in some patients, having outlined preliminarily the criteria for doing so.  I have deferred definitive adjuvant radiation therapy recommendations to a radiation oncologist in the future.  She went on to have a R skin sparing MRM an dL breast nipple sparing MRM with LARISA reconstruction. Path: 1- Left breast 2- Right breast 3- Right axillary sentinel lymph node 1 4- Right axillary sentinel lymph node 2 5- Right axillary sentinel lymph node 3 6- Right internal mammary lymph node 7- Left internal mammary lymph node  Final Diagnosis  1. Breast, left, mastectomy - Indtraductal papilloma - Proliferative fibrocystic change  2. Breast, right, mastectomy - Invasive moderate to poorly differentiated ductal carcinoma with focal microcalcifications (size: 35 mm), see synoptic summary - Ductal carcinoma in situ (DCIS), solid, cribriform, papillary and micropapillary types with intermediate to high nuclear grade, central necrosis and microcalcifications - Two lymph nodes, negative for carcinoma - Biopsy site change  Note: DCIS extends over a 80  mm area. Invasive carcinoma involves the inked cauterized posterior margin in 2 blocks (largest contiguous focus of involvement measures 2 mm) and inked cauterized anterior margin (focus measures < 1 mm ).  3. Lymph node, right axillary sentinel lymph node 1, excision - Four lymph nodes, negative for carcinoma  4. Lymph node, right axillary sentinel lymph node 2, excision - Three lymph nodes negative for carcinoma  5. Lymph node, right axillary sentinel lymph node 3, excision - Seven lymph nodes negative for carcinoma  6. Lymph node, right internal mammary, excision - One lymph node negative for carcinoma   7. Lymph node, left internal mammary, excision - Fibroadipose tissue with no significant histopathological change - No lymphoid tissue identified  2/2024 She is transferring care from Dr. Morales, first visit with me today.  Chart reviewed. Patient reports that she never started tamoxifen.  She is extremely concerned about endometrial toxicity and some of the other side effects related to tamoxifen.  She is under the impression that her cancer was low-grade and endocrine therapy is not necessary.  We reviewed pathology report in great detail.  We reviewed Oncotype results.  I explained the patient that chemotherapy  would have a marginal 1.6% benefit is not strongly recommended.  Endocrine therapy is strongly recommended due to strong ER/OK positivity and a relatively large tumor size.  Tamoxifen side effects were reviewed in detail.  She is very anxious about tamoxifen toxicity.  She reports very heavy menstrual periods.  We discussed treatment with ovarian suppression and anastrozole.  Patient is interested in ovarian suppression.  Side effects of Lupron and exemestane were reviewed.  She will start Lupron next week.  I will see her after 3-4 doses of Lupron to start AI.  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  We will monitor closely if ovarian suppression helps with inflammation and cysts.  Periods are regular, LMP 2/2024,  using condoms, stopped OCP after dx. Has 2 kids - 15 yrs and 5 yrs. Using condoms. Doesnt want more kids  11/20/2024 This is my second visit with her.  At first visit 2/2024 I strongly recommended to start endocrine therapy for curative intent.  At that time patient seemed to be in favor of Lupron plus AI but did not return to office for injections or appointments.  She was lost to follow-up.  Today she returned to office because she has breast pain.  She has been undergoing cosmetic procedures.  She is concerned about cancer recurrence.  She is requesting a PET scan.  She met with Dr. Casey (GYN) and wanted to get BSO.  Dr. Casey encouraged her to see me for a follow-up to discuss cancer directed therapy.  Patient reports that she has been speaking to people and has been reading reviews "to get insight about cancer treatment".  She did not start cancer treatment in February 2024 as she was concerned about side effects. Patient stated that she is "healthy" and she " had" cancer.  We reviewed pathology report in detail.  She had T2, grade 3 tumor with Oncotype DX of 18.  Chemotherapy had 1.6% benefit and was not offered.  Endocrine therapy was recommended by Dr. Morales (initial consult) and by me (second visit)  We discussed that there is a high likelihood of cancer recurrence and distant metastatic spread  leading to untimely death without adjuvant therapy.  Patient understood what was explained to her but was not quite convinced to start treatment.  I strongly encouraged her to consider second medical oncology opinion.  I also offered to call her spouse to explain that noncompliance with cancer treatment can lead to untimely death.  She did not want me to reach out to her spouse and stated  is on her side. She has very heavy menstrual bleeding.  We discussed side effects of tamoxifen as well as OS plus AI in detail.  I will favor OS plus AI given her young age of diagnosis and high risk disease.  She seems to be in favor of ovarian suppression but is also concerned about cardiac and bone toxicity.  We discussed cardio oncology and endocrinology management of side effects.   We also discussed recent approval of ribociclib for patients with high risk breast cancer.  Patient meets the criteria for adjuvant ribociclib (T2 N0 grade 3 tumor).   Despite a long discussion, patient seems to have conflicted opinion about adjuvant endocrine therapy We discussed to obtain a PET/CT and, follow-up with me in 1 to 2 weeks.   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  We will monitor closely if ovarian suppression helps with inflammation and cysts.  Periods are regular, LMP 11/2024,  using condoms, stopped OCP after dx. Has 2 kids - 15 yrs and 5 yrs. Using condoms. Doesnt want more kids   1/2025 CT CAP and bone scan 1/2025 JAMILA Here to start lupron today S/e reviewed AI to start 3/2025, ribo 4/2025 pt in agreement   We discussed cardio oncology and endocrinology management of side effects.   We also discussed recent approval of ribociclib for patients with high risk breast cancer.  Patient meets the criteria for adjuvant ribociclib (T2 N0 grade 3 tumor).  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  We will monitor closely if ovarian suppression helps with inflammation and cysts.    3/12/2025 She is here for lupron # 3  LMP 1/2025 She has mild muscle and joint issues. She is walking on treadmill x 60 min, 5 days a week mild hot flashes  discussed to start AI  I recommend Arimidex 1 mg daily for 5-10 years. I discussed the risks and benefits of aromatase inhibitor therapy including fatigue, coronary artery disease, hyperlipidemia, vaginal dryness, mood changes, hot flashes, GI disturbances, arthralgias, myalgias, and osteoporosis. I will obtain bone density scan to evaluate her bone health prior to starting anastrozole. I recommend her to continue calcium and vitamin D supplementation.  We will see her back in 2 months to start ribociclib at next visit. Will check blood work including hormone levels at next visit Will make a referral for cardio oncology and bone density in the next 6 to 12 months. Periods are regular, LMP 1/2025  using condoms, stopped OCP after dx. Has 2 kids - 15 yrs and 5 yrs. Using condoms. Doesnt want more kids  5/2025 She started lupron Jan 2025 She has had 4 injections She is still having regular periods 3/19/25 x 8 days  4/21/2025 x 6 days  estradiol 4/2025 35 She started anastrozole in 3/2025. No s/e If she continues to mensurate despite lupron, would consider BSO or switching to tamoxifen  Estradiol is lower than what is expected at her age, likely 2/2 lupron effect. Would give 2 more months  lupron today and next month f/u with me in 1 m to see if she is still menstruating  Will change to [place] next month if she gets another period this month  Will wait to add ribo until next visit   6/11/2025 Patient presents today to evaluate Lupron and her menstrual cycle. Lupron started January 2025.  She came for 6th injection yesterday.  She reports having regular menstrual bleeding despite being on Lupron.  Last menstrual period was May 27 to May 31.  She is expecting next menstrual period  On July 8.  We discussed that if she continues to have bleeding despite 6 Lupron injections, this would be considered Lupron failure.  We discussed option for switching back to tamoxifen or consider BSO.  She is not planning to have more children.  She has a teenage daughter who is graduating from high school and is going to nursing school.  She is interested in BSO.  Patient to follow-up with me July 9 to discuss next steps depending on expected  Menstrual period on July 8th.  Blood  work was performed April 2025 where estrogen level was elevated at 35.  We will repeat blood work in July. She is taking anastrozole for now without any side effects. We will continue to discuss ribociclib as well as she meets the criteria.  At this time given overwhelming issues related to menstrual bleeding, we will hold off    [de-identified] : This is a very pleasant 38-year-old premenopausal lady diagnosed with stage IA T2 N0 MX ER/MS strongly positive HER2/prieto negative poorly differentiated invasive ductal carcinoma, status post bilateral mastectomies with reconstruction 7/2023, completed radiation 11/2023 (margin issue).  BRCA panel test negative.  Oncotype DX 18.  She met with Dr. Morales 9/2023 and Tamoxifen was prescribed. Patient never started tamoxifen and saw me for first visit 2/2024.  We reviewed pathology and strongly recommended endocrine therapy.  Options for tamoxifen versus OS plus AI reviewed. CT JAMILA 1/1025. Lupron started 1/2025. AI 3/2025.   7/2025 Patient has been on Lupron since January 2025.  She continues to have irregular menstrual bleeding despite 6 months of Lupron.  She is on the full dose and she is compliant.  We discussed Lupron failure and BSO is recommended. Check FSH/E. Patient had seen Dr. Casey last year and will follow-up with her for BSO.  We discussed switching to tamoxifen in the meantime but patient is unwilling. LMP 6/2025 We will continue to discuss ribociclib as well as she meets the criteria.  At this time given overwhelming issues related to menstrual bleeding, we will hold off

## 2025-07-13 NOTE — HISTORY OF PRESENT ILLNESS
[de-identified] : The patient's history of present illness began when she first palpated a right upper breast lump sometime in February 2023.  She reports having a primary care routine visit scheduled for March 2023 and at that time she was referred for a diagnostic mammogram and sonogram.  These were performed on 04/20/2023 with the mammogram finding bilateral heterogeneously dense breasts; no suspicious findings were noted in the left breast; there was global asymmetry involving the right breast; in the area of the palpable concern, there was an isodense mass that was microlobulated and containing calcifications; additionally in close proximity, there was another area of architectural distortion with calcifications; there were microcalcifications at the anterior right retroareolar region that appeared to be a group of amorphous microcalcifications of note.  A bilateral breast ultrasound performed on that same date noted in the right breast in the area of palpable concern at the 1 o'clock axis, 3 centimeters from nipple, there was an irregular hypoechoic mass with angular margins measuring 2 x 1 x 2.1 centimeters, indeterminate; additionally at the 1 o'clock axis 2 centimeters from nipple, there was an additional hypoechoic irregular mass that measured 1.7 x 0.9 x 1.6 centimeters; there were multiple other round hypoechoic avascular masses measuring less than 1 centimeter throughout the right breast; there was a right axillary lymph node with cortical thickness of 0.4 centimeters.  An ultrasound sound guided core biopsy of the breast masses and axillary lymph node was recommended.  The patient went on to have an ultrasound-guided core biopsy of the right breast 1 o'clock axis lesion 3 centimeters from nipple with a finding of invasive moderately differentiated ductal carcinoma measuring 13 millimeters, with evidence of DCIS, estrogen receptor positive (greater than 95 percent), progesterone receptor positive (greater than 95 percent), and HER-2/prieto equivocal (2+) with confirmatory FISH returning non amplified/negative. The right axillary lymph node core biopsy returned with lymph node negative for carcinoma.     The patient went on to have a bilateral breast MRI on 05/19/2023 with a finding of marked background enhancement limiting sensitivity of bilateral breasts; in the right breast, contiguous multiple suspicious masses and regional area of non mass enhancement predominantly involving the upper outer and upper central breast with extension to the lower central and lower breast measuring at least 5.8 x 5.2 x 8.2 centimeters with a biopsy marker in the cranial aspect of the 1 o'clock lesions corresponding to biopsy-proven cancer; additionally there was asymmetric enhancement of the right nipple concerning for disease extension into the nipple-areolar complex; there was non mass enhancement abutting the pectoralis muscle without enhancement of the muscle itself or chest wall to suggest involvement; there are other scattered enhancing nonspecific foci; in the left breast, there were scattered enhancing nonspecific foci but no suspicious enhancement of the left breast; there were abnormal right axillary level 1 and level 2 lymph nodes with round morphology and effaced fatty hilum with the largest level 1 lymph node containing a biopsy marker measuring 1.4 centimeters with additional enlarged left axillary 1 lymph nodes ranging in size from 1.4-1.6 centimeters; there was an enlarged 8 millimeter right internal mammary lymph node; there was also a fat containing left internal mammary lymph node.  The patient was seen in consultation on 6/8/23 regarding further treatment recommendations.    The patient has had an [employer] Diagnostic Cancer Genetic Predisposition Panel Test done with a finding of no pathogenic mutations, but multiple variants of unknown significance.  I had an extensive discussion with Ms. Ambriz regarding her newly diagnosed breast cancer, clinical T3 N0 Mx, clinical stage 2A breast cancer, noting the implications of a clinical and radiologic presentation on subsequent risk of developing local and metastatic recurrence.   In light of the above, I recommended proceeding with primary breast surgery at this time, and the patient noted she has already decided to have bilateral mastectomies.  I have noted that if she is surgically operable, which I believe she is at this time, that doing so may provide us more information regarding definitive pathologic prognostic factors which may then drive decision-making as to her potential risk of metastatic recurrence and recommendations for potential adjuvant therapies.  I have noted that should she have a tumor less than 5 centimeters, and lymph node negative, I would consider having her tumor sent off for Oncotype DX testing, having discussed this molecular profiling tool and its role in decision-making as to whether to add adjuvant chemotherapy to adjuvant antiestrogen therapy.  Should she have any lymph node involvement, I have noted that per the RxPonder study, all patients would be recommended to undergo adjuvant chemotherapy and consequently would not send that study off.  I have noted that should she have any of the above that would be an indication for chemotherapy, that I would deliver adjuvant chemotherapy to be determined based on her pathologic findings to potentially decrease the risk of metastatic recurrence.  This would then be followed by adjuvant antiestrogen therapy with Lupron to render her chemically menopausal followed by an aromatase inhibitor such as anastrozole.  The potential risks, benefits, anticipated side effects were preliminarily discussed.  I have noted that should she have node-negative cancer, and have a low risk score on Oncotype DX testing, primary adjuvant antiestrogen therapy alone would be considered.   I asked the patient in advance if percarmandoce she requires adjuvant chemotherapy, whether she wishes any further childbearing and she has replied no and consequently will not refer her for fertility preservation consultation at this time.   I also noted that should her PET-CT scan return with evidence of metastatic disease, above recommendations would defer greatly.  I also discussed the potential role of adjuvant radiation therapy, even in the postmastectomy setting in some patients, having outlined preliminarily the criteria for doing so.  I have deferred definitive adjuvant radiation therapy recommendations to a radiation oncologist in the future.  She went on to have a R skin sparing MRM an dL breast nipple sparing MRM with LARISA reconstruction. Path: 1- Left breast 2- Right breast 3- Right axillary sentinel lymph node 1 4- Right axillary sentinel lymph node 2 5- Right axillary sentinel lymph node 3 6- Right internal mammary lymph node 7- Left internal mammary lymph node  Final Diagnosis  1. Breast, left, mastectomy - Indtraductal papilloma - Proliferative fibrocystic change  2. Breast, right, mastectomy - Invasive moderate to poorly differentiated ductal carcinoma with focal microcalcifications (size: 35 mm), see synoptic summary - Ductal carcinoma in situ (DCIS), solid, cribriform, papillary and micropapillary types with intermediate to high nuclear grade, central necrosis and microcalcifications - Two lymph nodes, negative for carcinoma - Biopsy site change  Note: DCIS extends over a 80  mm area. Invasive carcinoma involves the inked cauterized posterior margin in 2 blocks (largest contiguous focus of involvement measures 2 mm) and inked cauterized anterior margin (focus measures < 1 mm ).  3. Lymph node, right axillary sentinel lymph node 1, excision - Four lymph nodes, negative for carcinoma  4. Lymph node, right axillary sentinel lymph node 2, excision - Three lymph nodes negative for carcinoma  5. Lymph node, right axillary sentinel lymph node 3, excision - Seven lymph nodes negative for carcinoma  6. Lymph node, right internal mammary, excision - One lymph node negative for carcinoma   7. Lymph node, left internal mammary, excision - Fibroadipose tissue with no significant histopathological change - No lymphoid tissue identified  2/2024 She is transferring care from Dr. Morales, first visit with me today.  Chart reviewed. Patient reports that she never started tamoxifen.  She is extremely concerned about endometrial toxicity and some of the other side effects related to tamoxifen.  She is under the impression that her cancer was low-grade and endocrine therapy is not necessary.  We reviewed pathology report in great detail.  We reviewed Oncotype results.  I explained the patient that chemotherapy  would have a marginal 1.6% benefit is not strongly recommended.  Endocrine therapy is strongly recommended due to strong ER/KY positivity and a relatively large tumor size.  Tamoxifen side effects were reviewed in detail.  She is very anxious about tamoxifen toxicity.  She reports very heavy menstrual periods.  We discussed treatment with ovarian suppression and anastrozole.  Patient is interested in ovarian suppression.  Side effects of Lupron and exemestane were reviewed.  She will start Lupron next week.  I will see her after 3-4 doses of Lupron to start AI.  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  We will monitor closely if ovarian suppression helps with inflammation and cysts.  Periods are regular, LMP 2/2024,  using condoms, stopped OCP after dx. Has 2 kids - 15 yrs and 5 yrs. Using condoms. Doesnt want more kids  11/20/2024 This is my second visit with her.  At first visit 2/2024 I strongly recommended to start endocrine therapy for curative intent.  At that time patient seemed to be in favor of Lupron plus AI but did not return to office for injections or appointments.  She was lost to follow-up.  Today she returned to office because she has breast pain.  She has been undergoing cosmetic procedures.  She is concerned about cancer recurrence.  She is requesting a PET scan.  She met with Dr. Casey (GYN) and wanted to get BSO.  Dr. Casey encouraged her to see me for a follow-up to discuss cancer directed therapy.  Patient reports that she has been speaking to people and has been reading reviews "to get insight about cancer treatment".  She did not start cancer treatment in February 2024 as she was concerned about side effects. Patient stated that she is "healthy" and she " had" cancer.  We reviewed pathology report in detail.  She had T2, grade 3 tumor with Oncotype DX of 18.  Chemotherapy had 1.6% benefit and was not offered.  Endocrine therapy was recommended by Dr. Morales (initial consult) and by me (second visit)  We discussed that there is a high likelihood of cancer recurrence and distant metastatic spread  leading to untimely death without adjuvant therapy.  Patient understood what was explained to her but was not quite convinced to start treatment.  I strongly encouraged her to consider second medical oncology opinion.  I also offered to call her spouse to explain that noncompliance with cancer treatment can lead to untimely death.  She did not want me to reach out to her spouse and stated  is on her side. She has very heavy menstrual bleeding.  We discussed side effects of tamoxifen as well as OS plus AI in detail.  I will favor OS plus AI given her young age of diagnosis and high risk disease.  She seems to be in favor of ovarian suppression but is also concerned about cardiac and bone toxicity.  We discussed cardio oncology and endocrinology management of side effects.   We also discussed recent approval of ribociclib for patients with high risk breast cancer.  Patient meets the criteria for adjuvant ribociclib (T2 N0 grade 3 tumor).   Despite a long discussion, patient seems to have conflicted opinion about adjuvant endocrine therapy We discussed to obtain a PET/CT and, follow-up with me in 1 to 2 weeks.   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  We will monitor closely if ovarian suppression helps with inflammation and cysts.  Periods are regular, LMP 11/2024,  using condoms, stopped OCP after dx. Has 2 kids - 15 yrs and 5 yrs. Using condoms. Doesnt want more kids   1/2025 CT CAP and bone scan 1/2025 JAMILA Here to start lupron today S/e reviewed AI to start 3/2025, ribo 4/2025 pt in agreement   We discussed cardio oncology and endocrinology management of side effects.   We also discussed recent approval of ribociclib for patients with high risk breast cancer.  Patient meets the criteria for adjuvant ribociclib (T2 N0 grade 3 tumor).  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  We will monitor closely if ovarian suppression helps with inflammation and cysts.    3/12/2025 She is here for lupron # 3  LMP 1/2025 She has mild muscle and joint issues. She is walking on treadmill x 60 min, 5 days a week mild hot flashes  discussed to start AI  I recommend Arimidex 1 mg daily for 5-10 years. I discussed the risks and benefits of aromatase inhibitor therapy including fatigue, coronary artery disease, hyperlipidemia, vaginal dryness, mood changes, hot flashes, GI disturbances, arthralgias, myalgias, and osteoporosis. I will obtain bone density scan to evaluate her bone health prior to starting anastrozole. I recommend her to continue calcium and vitamin D supplementation.  We will see her back in 2 months to start ribociclib at next visit. Will check blood work including hormone levels at next visit Will make a referral for cardio oncology and bone density in the next 6 to 12 months. Periods are regular, LMP 1/2025  using condoms, stopped OCP after dx. Has 2 kids - 15 yrs and 5 yrs. Using condoms. Doesnt want more kids  5/2025 She started lupron Jan 2025 She has had 4 injections She is still having regular periods 3/19/25 x 8 days  4/21/2025 x 6 days  estradiol 4/2025 35 She started anastrozole in 3/2025. No s/e If she continues to mensurate despite lupron, would consider BSO or switching to tamoxifen  Estradiol is lower than what is expected at her age, likely 2/2 lupron effect. Would give 2 more months  lupron today and next month f/u with me in 1 m to see if she is still menstruating  Will change to [place] next month if she gets another period this month  Will wait to add ribo until next visit   6/11/2025 Patient presents today to evaluate Lupron and her menstrual cycle. Lupron started January 2025.  She came for 6th injection yesterday.  She reports having regular menstrual bleeding despite being on Lupron.  Last menstrual period was May 27 to May 31.  She is expecting next menstrual period  On July 8.  We discussed that if she continues to have bleeding despite 6 Lupron injections, this would be considered Lupron failure.  We discussed option for switching back to tamoxifen or consider BSO.  She is not planning to have more children.  She has a teenage daughter who is graduating from high school and is going to nursing school.  She is interested in BSO.  Patient to follow-up with me July 9 to discuss next steps depending on expected  Menstrual period on July 8th.  Blood  work was performed April 2025 where estrogen level was elevated at 35.  We will repeat blood work in July. She is taking anastrozole for now without any side effects. We will continue to discuss ribociclib as well as she meets the criteria.  At this time given overwhelming issues related to menstrual bleeding, we will hold off    [de-identified] : This is a very pleasant 38-year-old premenopausal lady diagnosed with stage IA T2 N0 MX ER/AK strongly positive HER2/prieto negative poorly differentiated invasive ductal carcinoma, status post bilateral mastectomies with reconstruction 7/2023, completed radiation 11/2023 (margin issue).  BRCA panel test negative.  Oncotype DX 18.  She met with Dr. Morales 9/2023 and Tamoxifen was prescribed. Patient never started tamoxifen and saw me for first visit 2/2024.  We reviewed pathology and strongly recommended endocrine therapy.  Options for tamoxifen versus OS plus AI reviewed. CT JAMILA 1/1025. Lupron started 1/2025. AI 3/2025.   7/2025 Patient has been on Lupron since January 2025.  She continues to have irregular menstrual bleeding despite 6 months of Lupron.  She is on the full dose and she is compliant.  We discussed Lupron failure and BSO is recommended. Check FSH/E. Patient had seen Dr. Casey last year and will follow-up with her for BSO.  We discussed switching to tamoxifen in the meantime but patient is unwilling. LMP 6/2025 We will continue to discuss ribociclib as well as she meets the criteria.  At this time given overwhelming issues related to menstrual bleeding, we will hold off

## 2025-07-22 NOTE — HISTORY OF PRESENT ILLNESS
[FreeTextEntry1] :  38 year old female presents for bilateral oophorectomy consultation. H/o breast cancer. Currently on Lupron - takes monthly. Reports periods have not stopped. States last month was lighter, but bleeding still occurs. Notes period is 2 days late. Denies desire for future pregnancy. C/o vaginal dryness. Pt is okay with emergency blood transfusion. C/o bloating - has seen GI. States GI won't allow colonoscopy until age 45. Pt works a desk job. has continued bleeidng on 7 months of lupron and heme onc rec BSO discussed risks of BSO/impact

## 2025-07-22 NOTE — END OF VISIT
[FreeTextEntry3] : I, Suha Anton, acted as a scribe on behalf of Dr. Safia Casey M.D. on 07/22/2025.   All medical entries made by the scribe were at my, Dr. Safia Casey M.D., direction and personally dictated by me on 07/22/2025. I have reviewed the chart and agree that the record accurately reflects my personal performance of the history, physical exam, assessment and plan. I have also personally directed, reviewed, and agreed with the chart.

## 2025-07-22 NOTE — PLAN
[FreeTextEntry1] :  38 year old female presents for bilateral oophorectomy consultation.  -Discussed r/b/a of laparoscopic BSO, discsused risks of procedure including but not limited to vte, ssi, damage to bowel bladder ureter thermal damage, need for laparotomy reviewed surgiacal menopause, risks on LT health, risk of bone loss, cardiac disease, dementia, discussed and pt aware will not be a candidate for generalized HRT. discussed vaginal dryness, hot flashes, mood swings, insomnia.  -discussed vezoah -discussed risks of malignancy -needs sono and PCP clearance Safia Casey MD